# Patient Record
Sex: MALE | URBAN - METROPOLITAN AREA
[De-identification: names, ages, dates, MRNs, and addresses within clinical notes are randomized per-mention and may not be internally consistent; named-entity substitution may affect disease eponyms.]

---

## 2022-03-01 RX ADMIN — Medication 975 MILLIGRAM(S): at 22:24

## 2022-03-28 ENCOUNTER — INPATIENT (INPATIENT)
Facility: HOSPITAL | Age: 57
LOS: 6 days | Discharge: HOME HEALTH | DRG: 956 | End: 2022-04-04
Attending: SURGERY | Admitting: SURGERY
Payer: COMMERCIAL

## 2022-03-28 VITALS
RESPIRATION RATE: 30 BRPM | TEMPERATURE: 99 F | DIASTOLIC BLOOD PRESSURE: 80 MMHG | OXYGEN SATURATION: 100 % | SYSTOLIC BLOOD PRESSURE: 136 MMHG | HEART RATE: 89 BPM

## 2022-03-28 DIAGNOSIS — S73.005A UNSPECIFIED DISLOCATION OF LEFT HIP, INITIAL ENCOUNTER: ICD-10-CM

## 2022-03-28 LAB
ALBUMIN SERPL ELPH-MCNC: 4.4 G/DL — SIGNIFICANT CHANGE UP (ref 3.3–5)
ALP SERPL-CCNC: 79 U/L — SIGNIFICANT CHANGE UP (ref 40–120)
ALT FLD-CCNC: 38 U/L — SIGNIFICANT CHANGE UP (ref 10–45)
ANION GAP SERPL CALC-SCNC: 15 MMOL/L — SIGNIFICANT CHANGE UP (ref 5–17)
APPEARANCE UR: CLEAR — SIGNIFICANT CHANGE UP
APTT BLD: 23.9 SEC — LOW (ref 27.5–35.5)
AST SERPL-CCNC: 36 U/L — SIGNIFICANT CHANGE UP (ref 10–40)
BACTERIA # UR AUTO: NEGATIVE — SIGNIFICANT CHANGE UP
BASE EXCESS BLDV CALC-SCNC: -1.2 MMOL/L — SIGNIFICANT CHANGE UP (ref -2–2)
BASOPHILS # BLD AUTO: 0.09 K/UL — SIGNIFICANT CHANGE UP (ref 0–0.2)
BASOPHILS NFR BLD AUTO: 0.7 % — SIGNIFICANT CHANGE UP (ref 0–2)
BILIRUB SERPL-MCNC: 0.3 MG/DL — SIGNIFICANT CHANGE UP (ref 0.2–1.2)
BILIRUB UR-MCNC: NEGATIVE — SIGNIFICANT CHANGE UP
BLD GP AB SCN SERPL QL: NEGATIVE — SIGNIFICANT CHANGE UP
BUN SERPL-MCNC: 21 MG/DL — SIGNIFICANT CHANGE UP (ref 7–23)
CA-I SERPL-SCNC: 1.16 MMOL/L — SIGNIFICANT CHANGE UP (ref 1.15–1.33)
CALCIUM SERPL-MCNC: 9 MG/DL — SIGNIFICANT CHANGE UP (ref 8.4–10.5)
CHLORIDE BLDV-SCNC: 100 MMOL/L — SIGNIFICANT CHANGE UP (ref 96–108)
CHLORIDE SERPL-SCNC: 101 MMOL/L — SIGNIFICANT CHANGE UP (ref 96–108)
CO2 BLDV-SCNC: 28 MMOL/L — HIGH (ref 22–26)
CO2 SERPL-SCNC: 24 MMOL/L — SIGNIFICANT CHANGE UP (ref 22–31)
COLOR SPEC: SIGNIFICANT CHANGE UP
CREAT SERPL-MCNC: 1.15 MG/DL — SIGNIFICANT CHANGE UP (ref 0.5–1.3)
DIFF PNL FLD: ABNORMAL
EGFR: 74 ML/MIN/1.73M2 — SIGNIFICANT CHANGE UP
EOSINOPHIL # BLD AUTO: 0.22 K/UL — SIGNIFICANT CHANGE UP (ref 0–0.5)
EOSINOPHIL NFR BLD AUTO: 1.7 % — SIGNIFICANT CHANGE UP (ref 0–6)
EPI CELLS # UR: 1 /HPF — SIGNIFICANT CHANGE UP
ETHANOL SERPL-MCNC: SIGNIFICANT CHANGE UP MG/DL (ref 0–10)
GAS PNL BLDV: 138 MMOL/L — SIGNIFICANT CHANGE UP (ref 136–145)
GAS PNL BLDV: SIGNIFICANT CHANGE UP
GAS PNL BLDV: SIGNIFICANT CHANGE UP
GLUCOSE BLDV-MCNC: 163 MG/DL — HIGH (ref 70–99)
GLUCOSE SERPL-MCNC: 167 MG/DL — HIGH (ref 70–99)
GLUCOSE UR QL: NEGATIVE — SIGNIFICANT CHANGE UP
HCO3 BLDV-SCNC: 27 MMOL/L — SIGNIFICANT CHANGE UP (ref 22–29)
HCT VFR BLD CALC: 42.7 % — SIGNIFICANT CHANGE UP (ref 39–50)
HCT VFR BLDA CALC: 44 % — SIGNIFICANT CHANGE UP (ref 39–51)
HGB BLD CALC-MCNC: 14.8 G/DL — SIGNIFICANT CHANGE UP (ref 12.6–17.4)
HGB BLD-MCNC: 14.4 G/DL — SIGNIFICANT CHANGE UP (ref 13–17)
HYALINE CASTS # UR AUTO: 1 /LPF — SIGNIFICANT CHANGE UP (ref 0–2)
IMM GRANULOCYTES NFR BLD AUTO: 0.7 % — SIGNIFICANT CHANGE UP (ref 0–1.5)
INR BLD: 0.99 RATIO — SIGNIFICANT CHANGE UP (ref 0.88–1.16)
KETONES UR-MCNC: NEGATIVE — SIGNIFICANT CHANGE UP
LACTATE BLDV-MCNC: 3.1 MMOL/L — HIGH (ref 0.7–2)
LEUKOCYTE ESTERASE UR-ACNC: NEGATIVE — SIGNIFICANT CHANGE UP
LIDOCAIN IGE QN: 40 U/L — SIGNIFICANT CHANGE UP (ref 7–60)
LYMPHOCYTES # BLD AUTO: 24.5 % — SIGNIFICANT CHANGE UP (ref 13–44)
LYMPHOCYTES # BLD AUTO: 3.13 K/UL — SIGNIFICANT CHANGE UP (ref 1–3.3)
MCHC RBC-ENTMCNC: 30.6 PG — SIGNIFICANT CHANGE UP (ref 27–34)
MCHC RBC-ENTMCNC: 33.7 GM/DL — SIGNIFICANT CHANGE UP (ref 32–36)
MCV RBC AUTO: 90.9 FL — SIGNIFICANT CHANGE UP (ref 80–100)
MONOCYTES # BLD AUTO: 1.15 K/UL — HIGH (ref 0–0.9)
MONOCYTES NFR BLD AUTO: 9 % — SIGNIFICANT CHANGE UP (ref 2–14)
NEUTROPHILS # BLD AUTO: 8.07 K/UL — HIGH (ref 1.8–7.4)
NEUTROPHILS NFR BLD AUTO: 63.4 % — SIGNIFICANT CHANGE UP (ref 43–77)
NITRITE UR-MCNC: NEGATIVE — SIGNIFICANT CHANGE UP
NRBC # BLD: 0 /100 WBCS — SIGNIFICANT CHANGE UP (ref 0–0)
PCO2 BLDV: 57 MMHG — HIGH (ref 42–55)
PH BLDV: 7.28 — LOW (ref 7.32–7.43)
PH UR: 6 — SIGNIFICANT CHANGE UP (ref 5–8)
PLATELET # BLD AUTO: 262 K/UL — SIGNIFICANT CHANGE UP (ref 150–400)
PO2 BLDV: 29 MMHG — SIGNIFICANT CHANGE UP (ref 25–45)
POTASSIUM BLDV-SCNC: 3.7 MMOL/L — SIGNIFICANT CHANGE UP (ref 3.5–5.1)
POTASSIUM SERPL-MCNC: 3.7 MMOL/L — SIGNIFICANT CHANGE UP (ref 3.5–5.3)
POTASSIUM SERPL-SCNC: 3.7 MMOL/L — SIGNIFICANT CHANGE UP (ref 3.5–5.3)
PROT SERPL-MCNC: 6.7 G/DL — SIGNIFICANT CHANGE UP (ref 6–8.3)
PROT UR-MCNC: ABNORMAL
PROTHROM AB SERPL-ACNC: 11.4 SEC — SIGNIFICANT CHANGE UP (ref 10.5–13.4)
RBC # BLD: 4.7 M/UL — SIGNIFICANT CHANGE UP (ref 4.2–5.8)
RBC # FLD: 12.6 % — SIGNIFICANT CHANGE UP (ref 10.3–14.5)
RBC CASTS # UR COMP ASSIST: 2 /HPF — SIGNIFICANT CHANGE UP (ref 0–4)
RH IG SCN BLD-IMP: NEGATIVE — SIGNIFICANT CHANGE UP
SAO2 % BLDV: 39.6 % — LOW (ref 67–88)
SARS-COV-2 RNA SPEC QL NAA+PROBE: SIGNIFICANT CHANGE UP
SODIUM SERPL-SCNC: 140 MMOL/L — SIGNIFICANT CHANGE UP (ref 135–145)
SP GR SPEC: 1.05 — HIGH (ref 1.01–1.02)
UROBILINOGEN FLD QL: NEGATIVE — SIGNIFICANT CHANGE UP
WBC # BLD: 12.75 K/UL — HIGH (ref 3.8–10.5)
WBC # FLD AUTO: 12.75 K/UL — HIGH (ref 3.8–10.5)
WBC UR QL: 2 /HPF — SIGNIFICANT CHANGE UP (ref 0–5)

## 2022-03-28 PROCEDURE — 99291 CRITICAL CARE FIRST HOUR: CPT | Mod: 25

## 2022-03-28 PROCEDURE — 73562 X-RAY EXAM OF KNEE 3: CPT | Mod: 26,LT

## 2022-03-28 PROCEDURE — 71045 X-RAY EXAM CHEST 1 VIEW: CPT | Mod: 26

## 2022-03-28 PROCEDURE — 72125 CT NECK SPINE W/O DYE: CPT | Mod: 26,MA

## 2022-03-28 PROCEDURE — 73552 X-RAY EXAM OF FEMUR 2/>: CPT | Mod: 26,LT

## 2022-03-28 PROCEDURE — 93010 ELECTROCARDIOGRAM REPORT: CPT

## 2022-03-28 PROCEDURE — 73502 X-RAY EXAM HIP UNI 2-3 VIEWS: CPT | Mod: 26,LT

## 2022-03-28 PROCEDURE — 70486 CT MAXILLOFACIAL W/O DYE: CPT | Mod: 26,MA

## 2022-03-28 PROCEDURE — 72170 X-RAY EXAM OF PELVIS: CPT | Mod: 26

## 2022-03-28 PROCEDURE — 99223 1ST HOSP IP/OBS HIGH 75: CPT

## 2022-03-28 PROCEDURE — 70450 CT HEAD/BRAIN W/O DYE: CPT | Mod: 26,59,MA

## 2022-03-28 PROCEDURE — 71260 CT THORAX DX C+: CPT | Mod: 26,MA

## 2022-03-28 PROCEDURE — 70498 CT ANGIOGRAPHY NECK: CPT | Mod: 26,MA

## 2022-03-28 PROCEDURE — 70496 CT ANGIOGRAPHY HEAD: CPT | Mod: 26,MA

## 2022-03-28 PROCEDURE — 74177 CT ABD & PELVIS W/CONTRAST: CPT | Mod: 26,MA

## 2022-03-28 PROCEDURE — 73590 X-RAY EXAM OF LOWER LEG: CPT | Mod: 26,LT

## 2022-03-28 RX ORDER — SODIUM CHLORIDE 9 MG/ML
250 INJECTION INTRAMUSCULAR; INTRAVENOUS; SUBCUTANEOUS ONCE
Refills: 0 | Status: COMPLETED | OUTPATIENT
Start: 2022-03-28 | End: 2022-03-28

## 2022-03-28 RX ORDER — IBUPROFEN 200 MG
400 TABLET ORAL EVERY 6 HOURS
Refills: 0 | Status: DISCONTINUED | OUTPATIENT
Start: 2022-03-29 | End: 2022-03-29

## 2022-03-28 RX ORDER — LIDOCAINE 4 G/100G
1 CREAM TOPICAL EVERY 24 HOURS
Refills: 0 | Status: DISCONTINUED | OUTPATIENT
Start: 2022-03-28 | End: 2022-03-29

## 2022-03-28 RX ORDER — ENOXAPARIN SODIUM 100 MG/ML
40 INJECTION SUBCUTANEOUS EVERY 24 HOURS
Refills: 0 | Status: DISCONTINUED | OUTPATIENT
Start: 2022-03-28 | End: 2022-03-29

## 2022-03-28 RX ORDER — SODIUM CHLORIDE 9 MG/ML
1000 INJECTION, SOLUTION INTRAVENOUS
Refills: 0 | Status: DISCONTINUED | OUTPATIENT
Start: 2022-03-28 | End: 2022-03-29

## 2022-03-28 RX ORDER — OXYCODONE HYDROCHLORIDE 5 MG/1
5 TABLET ORAL EVERY 4 HOURS
Refills: 0 | Status: DISCONTINUED | OUTPATIENT
Start: 2022-03-28 | End: 2022-03-29

## 2022-03-28 RX ORDER — ACETAMINOPHEN 500 MG
1000 TABLET ORAL ONCE
Refills: 0 | Status: COMPLETED | OUTPATIENT
Start: 2022-03-28 | End: 2022-03-28

## 2022-03-28 RX ORDER — ACETAMINOPHEN 500 MG
975 TABLET ORAL EVERY 6 HOURS
Refills: 0 | Status: DISCONTINUED | OUTPATIENT
Start: 2022-03-29 | End: 2022-03-29

## 2022-03-28 RX ORDER — SENNA PLUS 8.6 MG/1
2 TABLET ORAL AT BEDTIME
Refills: 0 | Status: DISCONTINUED | OUTPATIENT
Start: 2022-03-28 | End: 2022-03-29

## 2022-03-28 RX ORDER — HYDROMORPHONE HYDROCHLORIDE 2 MG/ML
0.5 INJECTION INTRAMUSCULAR; INTRAVENOUS; SUBCUTANEOUS ONCE
Refills: 0 | Status: DISCONTINUED | OUTPATIENT
Start: 2022-03-28 | End: 2022-03-28

## 2022-03-28 RX ORDER — FENTANYL CITRATE 50 UG/ML
50 INJECTION INTRAVENOUS ONCE
Refills: 0 | Status: DISCONTINUED | OUTPATIENT
Start: 2022-03-28 | End: 2022-03-28

## 2022-03-28 RX ORDER — POLYETHYLENE GLYCOL 3350 17 G/17G
17 POWDER, FOR SOLUTION ORAL DAILY
Refills: 0 | Status: DISCONTINUED | OUTPATIENT
Start: 2022-03-28 | End: 2022-03-29

## 2022-03-28 RX ORDER — CHLORHEXIDINE GLUCONATE 213 G/1000ML
1 SOLUTION TOPICAL
Refills: 0 | Status: DISCONTINUED | OUTPATIENT
Start: 2022-03-29 | End: 2022-03-29

## 2022-03-28 RX ORDER — OXYCODONE HYDROCHLORIDE 5 MG/1
10 TABLET ORAL EVERY 6 HOURS
Refills: 0 | Status: DISCONTINUED | OUTPATIENT
Start: 2022-03-28 | End: 2022-03-29

## 2022-03-28 RX ORDER — TETANUS TOXOID, REDUCED DIPHTHERIA TOXOID AND ACELLULAR PERTUSSIS VACCINE, ADSORBED 5; 2.5; 8; 8; 2.5 [IU]/.5ML; [IU]/.5ML; UG/.5ML; UG/.5ML; UG/.5ML
0.5 SUSPENSION INTRAMUSCULAR ONCE
Refills: 0 | Status: COMPLETED | OUTPATIENT
Start: 2022-03-28 | End: 2022-03-28

## 2022-03-28 RX ADMIN — HYDROMORPHONE HYDROCHLORIDE 0.5 MILLIGRAM(S): 2 INJECTION INTRAMUSCULAR; INTRAVENOUS; SUBCUTANEOUS at 22:24

## 2022-03-28 RX ADMIN — SODIUM CHLORIDE 250 MILLILITER(S): 9 INJECTION INTRAMUSCULAR; INTRAVENOUS; SUBCUTANEOUS at 20:28

## 2022-03-28 RX ADMIN — FENTANYL CITRATE 50 MICROGRAM(S): 50 INJECTION INTRAVENOUS at 21:08

## 2022-03-28 RX ADMIN — Medication 400 MILLIGRAM(S): at 21:10

## 2022-03-28 RX ADMIN — FENTANYL CITRATE 50 MICROGRAM(S): 50 INJECTION INTRAVENOUS at 20:05

## 2022-03-28 RX ADMIN — TETANUS TOXOID, REDUCED DIPHTHERIA TOXOID AND ACELLULAR PERTUSSIS VACCINE, ADSORBED 0.5 MILLILITER(S): 5; 2.5; 8; 8; 2.5 SUSPENSION INTRAMUSCULAR at 20:36
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
## 2022-03-28 NOTE — PROGRESS NOTE ADULT - SUBJECTIVE AND OBJECTIVE BOX
Pt seen  Chart reviewed  Full consult/procedure note dictation to follow    Open nasal fx  Lac repaired  Fracture reduced and splinted  Pt and wife instructed in care  f/u in office post d/c
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
## 2022-03-28 NOTE — ED PROVIDER NOTE - PHYSICAL EXAMINATION
General: in mild distress due to pain, well developed, ABC intact  HENT: laceration of the nose, PERRLA, EOMI, TTP of the maxilla and dry blood periorally, moist mucosa.  Neck: in c-collar   Cardiovascular: RRR, S1&2, no M or R, radial pulses equal and b/l, mild ecchymosis in the left lateral chest with TTP.   Respiratory: CTABL, no wheezes or crackles, no decreased breath sounds  Abdominal: soft and non-tender non distended, neg for guarding, no CVA tenderness   Extremities: right leg is inwardly and internally rotated, distal pulses intact in DP/PT, no edema of the legs/feet  Skin: warm, well perfused  Neurologic: moving distal extremities  AAOx3, following commands   Psych: normal mood and affect

## 2022-03-28 NOTE — ED PROVIDER NOTE - WET READ LAUNCH FT
There are 2 Wet Read(s) to document. There are no Wet Read(s) to document. There are 3 Wet Read(s) to document.

## 2022-03-28 NOTE — PATIENT PROFILE ADULT - FALL HARM RISK - HARM RISK INTERVENTIONS
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
## 2022-03-28 NOTE — ED PROVIDER NOTE - SECONDARY DIAGNOSIS.
Facial fracture Multiple fractures of ribs of left side Pneumothorax Closed fracture of lumbar vertebra without spinal cord injury
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
## 2022-03-28 NOTE — ED ADULT NURSE REASSESSMENT NOTE - NS ED NURSE REASSESS COMMENT FT1
Plastic surgery MD at bedside for consult & evaluation. Plastic surgery MD at bedside for consult & repair of B/L nasal fractures.
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
## 2022-03-28 NOTE — ED PROVIDER NOTE - NS ED ROS FT
GENERAL: No fever or chills, weight changes, nightsweats  EYES: no change in vision  HEENT: no dysplasia, odynophagia, ear pain, rhinorrhea, epistasis   CARDIAC: no palpitation   PULMONARY: no productive cough or SOB  GI: no abdominal pain, no nausea or no vomiting, no diarrhea or constipation  : No changes in urination for pain/freq.   SKIN: no rashes, abnormal bruising or bleeding  NEURO: no headache, numbness/tingling, extremity weakness   MSK: HPI
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
## 2022-03-28 NOTE — H&P ADULT - NSHPLABSRESULTS_GEN_ALL_CORE
CT cspine: IMPRESSION:   No vertebral fracture is recognized.  CTA head/neck: IMPRESSION:      1.   Right carotid system:  No hemodynamically significant stenosis.      2.   Left carotid system:  No hemodynamically significant stenosis.      3.   Intracranial circulation:  No hemodynamically significant   stenosis.      4.   Brain: No acute intracranial abnormality. Numerous displaced and   angulated bilateral nasal fractures. Please see report of CT facial bones   for facial evaluation. There is a large hematoma of the left cheek.  CT max face: IMPRESSION:  There are displaced and angulated bilateral nasal fractures, many of   which are angulated rightward. There is marked overlying soft tissue   swelling. There is a large hematoma of the left cheek. No definite   additional facial fractures are identified.  There are very small layering hemorrhagic fluid in the maxillary sinuses   and sphenoid sinuses.    CTA c/a/p:  IMPRESSION:  Fractures of the left sixth through ninth ribs and right second through   fourth ribs. The left eighth rib is fractured in 2 places.  Tiny left pneumothorax.  Probable pulmonary contusion in the anterior right upper lobe.  Age indeterminate fracture of the right L2 transverse process.  Posterior fracture dislocation of the left femoral head, which appears   impacted. Comminuted fracture of the left posterior acetabulum.

## 2022-03-29 DIAGNOSIS — S22.49XA MULTIPLE FRACTURES OF RIBS, UNSPECIFIED SIDE, INITIAL ENCOUNTER FOR CLOSED FRACTURE: ICD-10-CM

## 2022-03-29 DIAGNOSIS — E27.8 OTHER SPECIFIED DISORDERS OF ADRENAL GLAND: ICD-10-CM

## 2022-03-29 DIAGNOSIS — S02.2XXA FRACTURE OF NASAL BONES, INITIAL ENCOUNTER FOR CLOSED FRACTURE: ICD-10-CM

## 2022-03-29 DIAGNOSIS — Z01.818 ENCOUNTER FOR OTHER PREPROCEDURAL EXAMINATION: ICD-10-CM

## 2022-03-29 DIAGNOSIS — S72.002A FRACTURE OF UNSPECIFIED PART OF NECK OF LEFT FEMUR, INITIAL ENCOUNTER FOR CLOSED FRACTURE: ICD-10-CM

## 2022-03-29 DIAGNOSIS — R73.9 HYPERGLYCEMIA, UNSPECIFIED: ICD-10-CM

## 2022-03-29 LAB
A1C WITH ESTIMATED AVERAGE GLUCOSE RESULT: 5.4 % — SIGNIFICANT CHANGE UP (ref 4–5.6)
ANION GAP SERPL CALC-SCNC: 13 MMOL/L — SIGNIFICANT CHANGE UP (ref 5–17)
APTT BLD: 24.3 SEC — LOW (ref 27.5–35.5)
BUN SERPL-MCNC: 18 MG/DL — SIGNIFICANT CHANGE UP (ref 7–23)
CALCIUM SERPL-MCNC: 8.5 MG/DL — SIGNIFICANT CHANGE UP (ref 8.4–10.5)
CHLORIDE SERPL-SCNC: 104 MMOL/L — SIGNIFICANT CHANGE UP (ref 96–108)
CO2 SERPL-SCNC: 21 MMOL/L — LOW (ref 22–31)
CREAT SERPL-MCNC: 0.94 MG/DL — SIGNIFICANT CHANGE UP (ref 0.5–1.3)
EGFR: 95 ML/MIN/1.73M2 — SIGNIFICANT CHANGE UP
ESTIMATED AVERAGE GLUCOSE: 108 MG/DL — SIGNIFICANT CHANGE UP (ref 68–114)
GAS PNL BLDA: SIGNIFICANT CHANGE UP
GAS PNL BLDV: SIGNIFICANT CHANGE UP
GLUCOSE BLDC GLUCOMTR-MCNC: 116 MG/DL — HIGH (ref 70–99)
GLUCOSE BLDC GLUCOMTR-MCNC: 129 MG/DL — HIGH (ref 70–99)
GLUCOSE BLDC GLUCOMTR-MCNC: 226 MG/DL — HIGH (ref 70–99)
GLUCOSE SERPL-MCNC: 173 MG/DL — HIGH (ref 70–99)
HCT VFR BLD CALC: 40.3 % — SIGNIFICANT CHANGE UP (ref 39–50)
HCV AB S/CO SERPL IA: 0.07 S/CO — SIGNIFICANT CHANGE UP (ref 0–0.99)
HCV AB SERPL-IMP: SIGNIFICANT CHANGE UP
HGB BLD-MCNC: 13.6 G/DL — SIGNIFICANT CHANGE UP (ref 13–17)
INR BLD: 1.07 RATIO — SIGNIFICANT CHANGE UP (ref 0.88–1.16)
MAGNESIUM SERPL-MCNC: 1.9 MG/DL — SIGNIFICANT CHANGE UP (ref 1.6–2.6)
MCHC RBC-ENTMCNC: 30.2 PG — SIGNIFICANT CHANGE UP (ref 27–34)
MCHC RBC-ENTMCNC: 33.7 GM/DL — SIGNIFICANT CHANGE UP (ref 32–36)
MCV RBC AUTO: 89.4 FL — SIGNIFICANT CHANGE UP (ref 80–100)
NRBC # BLD: 0 /100 WBCS — SIGNIFICANT CHANGE UP (ref 0–0)
PHOSPHATE SERPL-MCNC: 2.3 MG/DL — LOW (ref 2.5–4.5)
PLATELET # BLD AUTO: 207 K/UL — SIGNIFICANT CHANGE UP (ref 150–400)
POTASSIUM SERPL-MCNC: 4.2 MMOL/L — SIGNIFICANT CHANGE UP (ref 3.5–5.3)
POTASSIUM SERPL-SCNC: 4.2 MMOL/L — SIGNIFICANT CHANGE UP (ref 3.5–5.3)
PROTHROM AB SERPL-ACNC: 12.3 SEC — SIGNIFICANT CHANGE UP (ref 10.5–13.4)
RBC # BLD: 4.51 M/UL — SIGNIFICANT CHANGE UP (ref 4.2–5.8)
RBC # FLD: 12.8 % — SIGNIFICANT CHANGE UP (ref 10.3–14.5)
SARS-COV-2 RNA SPEC QL NAA+PROBE: SIGNIFICANT CHANGE UP
SODIUM SERPL-SCNC: 138 MMOL/L — SIGNIFICANT CHANGE UP (ref 135–145)
WBC # BLD: 12.85 K/UL — HIGH (ref 3.8–10.5)
WBC # FLD AUTO: 12.85 K/UL — HIGH (ref 3.8–10.5)

## 2022-03-29 PROCEDURE — 99223 1ST HOSP IP/OBS HIGH 75: CPT

## 2022-03-29 PROCEDURE — 99223 1ST HOSP IP/OBS HIGH 75: CPT | Mod: GC

## 2022-03-29 PROCEDURE — 71045 X-RAY EXAM CHEST 1 VIEW: CPT | Mod: 26

## 2022-03-29 PROCEDURE — 93010 ELECTROCARDIOGRAM REPORT: CPT

## 2022-03-29 DEVICE — SCREW CORT S-T 3.5X36MM: Type: IMPLANTABLE DEVICE | Site: LEFT | Status: FUNCTIONAL

## 2022-03-29 DEVICE — IMPLANTABLE DEVICE: Type: IMPLANTABLE DEVICE | Site: LEFT | Status: FUNCTIONAL

## 2022-03-29 DEVICE — KIT A-LINE 1LUM 20G X 12CM SAFE KIT: Type: IMPLANTABLE DEVICE | Site: LEFT | Status: FUNCTIONAL

## 2022-03-29 DEVICE — SCREW CORT S-T 3.5X18MM: Type: IMPLANTABLE DEVICE | Site: LEFT | Status: FUNCTIONAL

## 2022-03-29 DEVICE — SCREW CORT S-T 3.5X40MM: Type: IMPLANTABLE DEVICE | Site: LEFT | Status: FUNCTIONAL

## 2022-03-29 DEVICE — SCREW CORT S-T 3.5X28MM: Type: IMPLANTABLE DEVICE | Site: LEFT | Status: FUNCTIONAL

## 2022-03-29 DEVICE — SCREW CORTEX S-T 2.7X28MM: Type: IMPLANTABLE DEVICE | Site: LEFT | Status: FUNCTIONAL

## 2022-03-29 DEVICE — K-WIRE DEPUY (SMOOTH) TROCAR POINT 0.62 X 9": Type: IMPLANTABLE DEVICE | Site: LEFT | Status: FUNCTIONAL

## 2022-03-29 DEVICE — SCREW CORT 3.5MM X 44MM: Type: IMPLANTABLE DEVICE | Site: LEFT | Status: FUNCTIONAL

## 2022-03-29 DEVICE — SCREW CORT S-T 3.5X38MM: Type: IMPLANTABLE DEVICE | Site: LEFT | Status: FUNCTIONAL

## 2022-03-29 RX ORDER — ONDANSETRON 8 MG/1
4 TABLET, FILM COATED ORAL EVERY 6 HOURS
Refills: 0 | Status: DISCONTINUED | OUTPATIENT
Start: 2022-03-29 | End: 2022-03-29

## 2022-03-29 RX ORDER — INSULIN LISPRO 100/ML
VIAL (ML) SUBCUTANEOUS EVERY 6 HOURS
Refills: 0 | Status: DISCONTINUED | OUTPATIENT
Start: 2022-03-29 | End: 2022-03-29

## 2022-03-29 RX ORDER — HYDROMORPHONE HYDROCHLORIDE 2 MG/ML
30 INJECTION INTRAMUSCULAR; INTRAVENOUS; SUBCUTANEOUS
Refills: 0 | Status: DISCONTINUED | OUTPATIENT
Start: 2022-03-29 | End: 2022-03-29

## 2022-03-29 RX ORDER — DIPHENHYDRAMINE HCL 50 MG
25 CAPSULE ORAL EVERY 4 HOURS
Refills: 0 | Status: DISCONTINUED | OUTPATIENT
Start: 2022-03-29 | End: 2022-03-29

## 2022-03-29 RX ORDER — HYDROMORPHONE HYDROCHLORIDE 2 MG/ML
0.5 INJECTION INTRAMUSCULAR; INTRAVENOUS; SUBCUTANEOUS
Refills: 0 | Status: DISCONTINUED | OUTPATIENT
Start: 2022-03-29 | End: 2022-03-29

## 2022-03-29 RX ORDER — MAGNESIUM SULFATE 500 MG/ML
2 VIAL (ML) INJECTION ONCE
Refills: 0 | Status: COMPLETED | OUTPATIENT
Start: 2022-03-29 | End: 2022-03-29

## 2022-03-29 RX ORDER — NALOXONE HYDROCHLORIDE 4 MG/.1ML
0.1 SPRAY NASAL
Refills: 0 | Status: DISCONTINUED | OUTPATIENT
Start: 2022-03-29 | End: 2022-03-29

## 2022-03-29 RX ORDER — ENOXAPARIN SODIUM 100 MG/ML
40 INJECTION SUBCUTANEOUS EVERY 24 HOURS
Refills: 0 | Status: DISCONTINUED | OUTPATIENT
Start: 2022-03-29 | End: 2022-03-29

## 2022-03-29 RX ADMIN — POLYETHYLENE GLYCOL 3350 17 GRAM(S): 17 POWDER, FOR SOLUTION ORAL at 12:11

## 2022-03-29 RX ADMIN — OXYCODONE HYDROCHLORIDE 10 MILLIGRAM(S): 5 TABLET ORAL at 01:46

## 2022-03-29 RX ADMIN — HYDROMORPHONE HYDROCHLORIDE 0.5 MILLIGRAM(S): 2 INJECTION INTRAMUSCULAR; INTRAVENOUS; SUBCUTANEOUS at 03:20

## 2022-03-29 RX ADMIN — Medication 250 MILLIMOLE(S): at 04:31

## 2022-03-29 RX ADMIN — Medication 400 MILLIGRAM(S): at 15:22

## 2022-03-29 RX ADMIN — OXYCODONE HYDROCHLORIDE 10 MILLIGRAM(S): 5 TABLET ORAL at 02:16

## 2022-03-29 RX ADMIN — Medication 400 MILLIGRAM(S): at 12:50

## 2022-03-29 RX ADMIN — Medication 400 MILLIGRAM(S): at 10:45

## 2022-03-29 RX ADMIN — HYDROMORPHONE HYDROCHLORIDE 0.5 MILLIGRAM(S): 2 INJECTION INTRAMUSCULAR; INTRAVENOUS; SUBCUTANEOUS at 06:49

## 2022-03-29 RX ADMIN — Medication 400 MILLIGRAM(S): at 10:13

## 2022-03-29 RX ADMIN — Medication 2: at 05:14

## 2022-03-29 RX ADMIN — CHLORHEXIDINE GLUCONATE 1 APPLICATION(S): 213 SOLUTION TOPICAL at 05:15

## 2022-03-29 RX ADMIN — Medication 400 MILLIGRAM(S): at 03:04

## 2022-03-29 RX ADMIN — LIDOCAINE 1 PATCH: 4 CREAM TOPICAL at 05:14

## 2022-03-29 RX ADMIN — Medication 975 MILLIGRAM(S): at 12:26

## 2022-03-29 RX ADMIN — HYDROMORPHONE HYDROCHLORIDE 0.5 MILLIGRAM(S): 2 INJECTION INTRAMUSCULAR; INTRAVENOUS; SUBCUTANEOUS at 03:07

## 2022-03-29 RX ADMIN — Medication 975 MILLIGRAM(S): at 05:13

## 2022-03-29 RX ADMIN — Medication 25 GRAM(S): at 03:04

## 2022-03-29 RX ADMIN — Medication 975 MILLIGRAM(S): at 12:11

## 2022-03-29 RX ADMIN — LIDOCAINE 1 PATCH: 4 CREAM TOPICAL at 05:13

## 2022-03-29 NOTE — CONSULT NOTE ADULT - PROBLEM SELECTOR RECOMMENDATION 2
- Plastic surgery evaluation noted  - Laceration repaired  - Nasal fracture reduced and splinted  - Plan to follow-up as outpatient
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
## 2022-03-29 NOTE — PROGRESS NOTE ADULT - SUBJECTIVE AND OBJECTIVE BOX
ACS DAILY PROGRESS NOTE:     24 HOUR EVENTS: Cervical collar cleared    Patient seen and examined at bedside. Patient complains of pain around his left hip. Denies chest pain, sob, n/v, fever/chills.     MEDICATIONS  (STANDING):  acetaminophen     Tablet .. 975 milliGRAM(s) Oral every 6 hours  chlorhexidine 2% Cloths 1 Application(s) Topical <User Schedule>  ibuprofen  Tablet. 400 milliGRAM(s) Oral every 6 hours  insulin lispro (ADMELOG) corrective regimen sliding scale   SubCutaneous every 6 hours  lactated ringers. 1000 milliLiter(s) (125 mL/Hr) IV Continuous <Continuous>  lidocaine   4% Patch 1 Patch Transdermal every 24 hours  lidocaine   4% Patch 1 Patch Transdermal every 24 hours  polyethylene glycol 3350 17 Gram(s) Oral daily  senna 2 Tablet(s) Oral at bedtime    MEDICATIONS  (PRN):  HYDROmorphone  Injectable 0.5 milliGRAM(s) IV Push every 3 hours PRN Severe Breakthrough Pain  oxyCODONE    IR 10 milliGRAM(s) Oral every 6 hours PRN Severe Pain (7 - 10)  oxyCODONE    IR 5 milliGRAM(s) Oral every 4 hours PRN Moderate Pain (4 - 6)      OBJECTIVE:    Vital Signs Last 24 Hrs  T(C): 37 (29 Mar 2022 03:00), Max: 37 (28 Mar 2022 19:38)  T(F): 98.6 (29 Mar 2022 03:00), Max: 98.6 (28 Mar 2022 19:38)  HR: 91 (29 Mar 2022 06:00) (84 - 103)  BP: 127/60 (29 Mar 2022 06:00) (127/60 - 154/82)  BP(mean): 87 (29 Mar 2022 06:00) (87 - 107)  RR: 13 (29 Mar 2022 06:00) (12 - 30)  SpO2: 93% (29 Mar 2022 06:00) (93% - 100%)        I&O's Detail    28 Mar 2022 07:01  -  29 Mar 2022 07:00  --------------------------------------------------------  IN:    IV PiggyBack: 550 mL    Lactated Ringers: 750 mL  Total IN: 1300 mL    OUT:    Voided (mL): 425 mL  Total OUT: 425 mL    Total NET: 875 mL          Daily Height in cm: 172.72 (28 Mar 2022 23:15)    Daily Weight in k (29 Mar 2022 00:00)    LABS:                        13.6   12.85 )-----------( 207      ( 29 Mar 2022 00:07 )             40.3     0329    138  |  104  |  18  ----------------------------<  173<H>  4.2   |  21<L>  |  0.94    Ca    8.5      29 Mar 2022 00:07  Phos  2.3       Mg     1.9         TPro  6.7  /  Alb  4.4  /  TBili  0.3  /  DBili  x   /  AST  36  /  ALT  38  /  AlkPhos  79  03-28    PT/INR - ( 29 Mar 2022 04:18 )   PT: 12.3 sec;   INR: 1.07 ratio         PTT - ( 29 Mar 2022 04:18 )  PTT:24.3 sec  Urinalysis Basic - ( 28 Mar 2022 21:48 )    Color: Light Yellow / Appearance: Clear / S.046 / pH: x  Gluc: x / Ketone: Negative  / Bili: Negative / Urobili: Negative   Blood: x / Protein: 30 mg/dL / Nitrite: Negative   Leuk Esterase: Negative / RBC: 2 /hpf / WBC 2 /HPF   Sq Epi: x / Non Sq Epi: 1 /hpf / Bacteria: Negative        Physical Exam  Gen: NAD, A&Ox3  Pulm: No respiratory distress,  bilateral chest wall tenderness, left chest wall ecchymosis  CV: RRR, no JVD  Abd:  soft, nontender, nondistended, LUQ ecchymosis  Extremities:  FROM, warm and well perfused, hip ROM limited 2/2 pain. +dp

## 2022-03-29 NOTE — SBIRT NOTE ADULT - NSSBIRTALCPOSREINDET_GEN_A_CORE
Patient reports 1-2 drinks 2-3x a week. Patient denies his use to be a problem at this time and declined resources.
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
## 2022-03-29 NOTE — PHYSICAL THERAPY INITIAL EVALUATION ADULT - GENERAL OBSERVATIONS, REHAB EVAL
pt recd supine in bed (+)tele (+)o2 via nc 2L (+)pulse ox (+)hip abductor pillow; family at bedside pt recd supine in bed (+)tele (+)o2 via nc 2L (+)pulse ox (+)hip abductor pillow (+)chest tube; family at bedside
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
## 2022-03-29 NOTE — PHYSICAL THERAPY INITIAL EVALUATION ADULT - DISCHARGE DISPOSITION, PT EVAL
progress towards home, family available to assist progress towards home, family available to assist, home PT for balance, gait and strengthening, DME rolling walker
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
## 2022-03-29 NOTE — PHYSICAL THERAPY INITIAL EVALUATION ADULT - ACTIVE RANGE OF MOTION EXAMINATION, REHAB EVAL
left hip THP maintained/bilateral upper extremity Active ROM was WFL (within functional limits)/bilateral  lower extremity Active ROM was WFL (within functional limits)
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
## 2022-03-29 NOTE — PHYSICAL THERAPY INITIAL EVALUATION ADULT - CRITERIA FOR SKILLED THERAPEUTIC INTERVENTIONS
impairments found/functional limitations in following categories/rehab potential/therapy frequency/anticipated equipment needs at discharge/anticipated discharge recommendation
Normal rate, regular rhythm.  Heart sounds S1, S2.  No murmurs, rubs or gallops.

## 2022-03-29 NOTE — PHYSICAL THERAPY INITIAL EVALUATION ADULT - ADDITIONAL COMMENTS
pt lives in apartment with wife, no stairs to negotiate. pt independent with mobility and did not use assisitve device Rituxan Counseling:  I discussed with the patient the risks of Rituxan infusions. Side effects can include infusion reactions, severe drug rashes including mucocutaneous reactions, reactivation of latent hepatitis and other infections and rarely progressive multifocal leukoencephalopathy.  All of the patient's questions and concerns were addressed.
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
## 2022-03-29 NOTE — CONSULT NOTE ADULT - PROBLEM SELECTOR RECOMMENDATION 5
Incidental, small size. Normotensive with normal electrolytes otherwise.  - Finding communicated to patient and wife at bedside.  - Should undergo functional testing post hospitalization, and repeat imaging to ensure size stability.  - No inpatient work-up planned at this time.
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
## 2022-03-29 NOTE — CONSULT NOTE ADULT - PROBLEM SELECTOR RECOMMENDATION 6
EKG reviewed: NSR. No history of personal cardiac condition. RCRI score is 0. Excellent exercise tolerance.  - No further cardiac testing indicated for this emergent procedure.  - Patient is medically optimized for OR today.

## 2022-03-29 NOTE — CONSULT NOTE ADULT - PROBLEM SELECTOR RECOMMENDATION 9
- Plan for OR today with orthopedic surgery  - NPO in anticipation of procedure  - VTE PPx per orthopedic surgery  - Pain control as ordered with bowel regimen

## 2022-03-30 LAB
ANION GAP SERPL CALC-SCNC: 11 MMOL/L — SIGNIFICANT CHANGE UP (ref 5–17)
APTT BLD: 25.2 SEC — LOW (ref 27.5–35.5)
BUN SERPL-MCNC: 13 MG/DL — SIGNIFICANT CHANGE UP (ref 7–23)
CALCIUM SERPL-MCNC: 8.3 MG/DL — LOW (ref 8.4–10.5)
CHLORIDE SERPL-SCNC: 104 MMOL/L — SIGNIFICANT CHANGE UP (ref 96–108)
CO2 SERPL-SCNC: 24 MMOL/L — SIGNIFICANT CHANGE UP (ref 22–31)
CREAT SERPL-MCNC: 0.92 MG/DL — SIGNIFICANT CHANGE UP (ref 0.5–1.3)
EGFR: 97 ML/MIN/1.73M2 — SIGNIFICANT CHANGE UP
GAS PNL BLDA: SIGNIFICANT CHANGE UP
GLUCOSE BLDC GLUCOMTR-MCNC: 135 MG/DL — HIGH (ref 70–99)
GLUCOSE BLDC GLUCOMTR-MCNC: 138 MG/DL — HIGH (ref 70–99)
GLUCOSE BLDC GLUCOMTR-MCNC: 192 MG/DL — HIGH (ref 70–99)
GLUCOSE SERPL-MCNC: 182 MG/DL — HIGH (ref 70–99)
HCT VFR BLD CALC: 27.6 % — LOW (ref 39–50)
HGB BLD-MCNC: 9.2 G/DL — LOW (ref 13–17)
INR BLD: 1.15 RATIO — SIGNIFICANT CHANGE UP (ref 0.88–1.16)
MAGNESIUM SERPL-MCNC: 2.2 MG/DL — SIGNIFICANT CHANGE UP (ref 1.6–2.6)
MCHC RBC-ENTMCNC: 31 PG — SIGNIFICANT CHANGE UP (ref 27–34)
MCHC RBC-ENTMCNC: 33.3 GM/DL — SIGNIFICANT CHANGE UP (ref 32–36)
MCV RBC AUTO: 92.9 FL — SIGNIFICANT CHANGE UP (ref 80–100)
NRBC # BLD: 0 /100 WBCS — SIGNIFICANT CHANGE UP (ref 0–0)
PHOSPHATE SERPL-MCNC: 4 MG/DL — SIGNIFICANT CHANGE UP (ref 2.5–4.5)
PLATELET # BLD AUTO: 142 K/UL — LOW (ref 150–400)
POTASSIUM SERPL-MCNC: 4.6 MMOL/L — SIGNIFICANT CHANGE UP (ref 3.5–5.3)
POTASSIUM SERPL-SCNC: 4.6 MMOL/L — SIGNIFICANT CHANGE UP (ref 3.5–5.3)
PROTHROM AB SERPL-ACNC: 13.4 SEC — SIGNIFICANT CHANGE UP (ref 10.5–13.4)
RBC # BLD: 2.97 M/UL — LOW (ref 4.2–5.8)
RBC # FLD: 12.9 % — SIGNIFICANT CHANGE UP (ref 10.3–14.5)
SODIUM SERPL-SCNC: 139 MMOL/L — SIGNIFICANT CHANGE UP (ref 135–145)
WBC # BLD: 7.87 K/UL — SIGNIFICANT CHANGE UP (ref 3.8–10.5)
WBC # FLD AUTO: 7.87 K/UL — SIGNIFICANT CHANGE UP (ref 3.8–10.5)

## 2022-03-30 PROCEDURE — 71045 X-RAY EXAM CHEST 1 VIEW: CPT | Mod: 26,77

## 2022-03-30 PROCEDURE — 88304 TISSUE EXAM BY PATHOLOGIST: CPT | Mod: 26

## 2022-03-30 PROCEDURE — 99233 SBSQ HOSP IP/OBS HIGH 50: CPT | Mod: GC

## 2022-03-30 PROCEDURE — 71045 X-RAY EXAM CHEST 1 VIEW: CPT | Mod: 26

## 2022-03-30 PROCEDURE — 32551 INSERTION OF CHEST TUBE: CPT

## 2022-03-30 PROCEDURE — 99233 SBSQ HOSP IP/OBS HIGH 50: CPT

## 2022-03-30 PROCEDURE — 88311 DECALCIFY TISSUE: CPT | Mod: 26

## 2022-03-30 PROCEDURE — 72190 X-RAY EXAM OF PELVIS: CPT | Mod: 26

## 2022-03-30 RX ORDER — LIDOCAINE 4 G/100G
1 CREAM TOPICAL EVERY 24 HOURS
Refills: 0 | Status: DISCONTINUED | OUTPATIENT
Start: 2022-03-30 | End: 2022-04-04

## 2022-03-30 RX ORDER — PROPOFOL 10 MG/ML
30 INJECTION, EMULSION INTRAVENOUS
Qty: 1000 | Refills: 0 | Status: DISCONTINUED | OUTPATIENT
Start: 2022-03-30 | End: 2022-03-30

## 2022-03-30 RX ORDER — POLYETHYLENE GLYCOL 3350 17 G/17G
17 POWDER, FOR SOLUTION ORAL DAILY
Refills: 0 | Status: DISCONTINUED | OUTPATIENT
Start: 2022-03-30 | End: 2022-04-01

## 2022-03-30 RX ORDER — SODIUM CHLORIDE 9 MG/ML
1000 INJECTION, SOLUTION INTRAVENOUS
Refills: 0 | Status: DISCONTINUED | OUTPATIENT
Start: 2022-03-30 | End: 2022-03-30

## 2022-03-30 RX ORDER — RIVAROXABAN 15 MG-20MG
10 KIT ORAL
Refills: 0 | Status: DISCONTINUED | OUTPATIENT
Start: 2022-03-30 | End: 2022-04-04

## 2022-03-30 RX ORDER — HYDROMORPHONE HYDROCHLORIDE 2 MG/ML
0.5 INJECTION INTRAMUSCULAR; INTRAVENOUS; SUBCUTANEOUS
Refills: 0 | Status: DISCONTINUED | OUTPATIENT
Start: 2022-03-30 | End: 2022-03-31

## 2022-03-30 RX ORDER — CHLORHEXIDINE GLUCONATE 213 G/1000ML
1 SOLUTION TOPICAL
Refills: 0 | Status: DISCONTINUED | OUTPATIENT
Start: 2022-03-30 | End: 2022-04-03

## 2022-03-30 RX ORDER — PANTOPRAZOLE SODIUM 20 MG/1
40 TABLET, DELAYED RELEASE ORAL DAILY
Refills: 0 | Status: DISCONTINUED | OUTPATIENT
Start: 2022-03-30 | End: 2022-03-30

## 2022-03-30 RX ORDER — SENNA PLUS 8.6 MG/1
2 TABLET ORAL AT BEDTIME
Refills: 0 | Status: DISCONTINUED | OUTPATIENT
Start: 2022-03-30 | End: 2022-04-04

## 2022-03-30 RX ORDER — CHLORHEXIDINE GLUCONATE 213 G/1000ML
15 SOLUTION TOPICAL EVERY 12 HOURS
Refills: 0 | Status: DISCONTINUED | OUTPATIENT
Start: 2022-03-30 | End: 2022-03-30

## 2022-03-30 RX ORDER — HYDROMORPHONE HYDROCHLORIDE 2 MG/ML
1 INJECTION INTRAMUSCULAR; INTRAVENOUS; SUBCUTANEOUS ONCE
Refills: 0 | Status: DISCONTINUED | OUTPATIENT
Start: 2022-03-30 | End: 2022-03-30

## 2022-03-30 RX ORDER — OXYCODONE HYDROCHLORIDE 5 MG/1
5 TABLET ORAL EVERY 4 HOURS
Refills: 0 | Status: DISCONTINUED | OUTPATIENT
Start: 2022-03-30 | End: 2022-04-04

## 2022-03-30 RX ORDER — INSULIN LISPRO 100/ML
VIAL (ML) SUBCUTANEOUS EVERY 6 HOURS
Refills: 0 | Status: DISCONTINUED | OUTPATIENT
Start: 2022-03-30 | End: 2022-03-30

## 2022-03-30 RX ORDER — ACETAMINOPHEN 500 MG
1000 TABLET ORAL EVERY 6 HOURS
Refills: 0 | Status: COMPLETED | OUTPATIENT
Start: 2022-03-30 | End: 2022-03-31

## 2022-03-30 RX ADMIN — OXYCODONE HYDROCHLORIDE 5 MILLIGRAM(S): 5 TABLET ORAL at 18:30

## 2022-03-30 RX ADMIN — HYDROMORPHONE HYDROCHLORIDE 0.5 MILLIGRAM(S): 2 INJECTION INTRAMUSCULAR; INTRAVENOUS; SUBCUTANEOUS at 17:16

## 2022-03-30 RX ADMIN — Medication 1000 MILLIGRAM(S): at 22:19

## 2022-03-30 RX ADMIN — LIDOCAINE 1 PATCH: 4 CREAM TOPICAL at 17:27

## 2022-03-30 RX ADMIN — HYDROMORPHONE HYDROCHLORIDE 0.5 MILLIGRAM(S): 2 INJECTION INTRAMUSCULAR; INTRAVENOUS; SUBCUTANEOUS at 12:25

## 2022-03-30 RX ADMIN — PROPOFOL 15.7 MICROGRAM(S)/KG/MIN: 10 INJECTION, EMULSION INTRAVENOUS at 05:40

## 2022-03-30 RX ADMIN — LIDOCAINE 1 PATCH: 4 CREAM TOPICAL at 07:22

## 2022-03-30 RX ADMIN — CHLORHEXIDINE GLUCONATE 1 APPLICATION(S): 213 SOLUTION TOPICAL at 05:40

## 2022-03-30 RX ADMIN — HYDROMORPHONE HYDROCHLORIDE 1 MILLIGRAM(S): 2 INJECTION INTRAMUSCULAR; INTRAVENOUS; SUBCUTANEOUS at 05:00

## 2022-03-30 RX ADMIN — Medication 1000 MILLIGRAM(S): at 15:23

## 2022-03-30 RX ADMIN — Medication 400 MILLIGRAM(S): at 15:08

## 2022-03-30 RX ADMIN — LIDOCAINE 1 PATCH: 4 CREAM TOPICAL at 05:40

## 2022-03-30 RX ADMIN — Medication 400 MILLIGRAM(S): at 09:33

## 2022-03-30 RX ADMIN — Medication 2: at 05:58

## 2022-03-30 RX ADMIN — Medication 400 MILLIGRAM(S): at 22:04

## 2022-03-30 RX ADMIN — HYDROMORPHONE HYDROCHLORIDE 0.5 MILLIGRAM(S): 2 INJECTION INTRAMUSCULAR; INTRAVENOUS; SUBCUTANEOUS at 07:05

## 2022-03-30 RX ADMIN — Medication 1000 MILLIGRAM(S): at 09:48

## 2022-03-30 RX ADMIN — OXYCODONE HYDROCHLORIDE 5 MILLIGRAM(S): 5 TABLET ORAL at 19:00

## 2022-03-30 RX ADMIN — SENNA PLUS 2 TABLET(S): 8.6 TABLET ORAL at 22:04

## 2022-03-30 RX ADMIN — PROPOFOL 15.7 MICROGRAM(S)/KG/MIN: 10 INJECTION, EMULSION INTRAVENOUS at 07:44

## 2022-03-30 RX ADMIN — HYDROMORPHONE HYDROCHLORIDE 0.5 MILLIGRAM(S): 2 INJECTION INTRAMUSCULAR; INTRAVENOUS; SUBCUTANEOUS at 17:31

## 2022-03-30 RX ADMIN — OXYCODONE HYDROCHLORIDE 5 MILLIGRAM(S): 5 TABLET ORAL at 22:40

## 2022-03-30 RX ADMIN — CHLORHEXIDINE GLUCONATE 15 MILLILITER(S): 213 SOLUTION TOPICAL at 05:39

## 2022-03-30 RX ADMIN — LIDOCAINE 1 PATCH: 4 CREAM TOPICAL at 07:00

## 2022-03-30 RX ADMIN — OXYCODONE HYDROCHLORIDE 5 MILLIGRAM(S): 5 TABLET ORAL at 23:10

## 2022-03-30 RX ADMIN — RIVAROXABAN 10 MILLIGRAM(S): KIT at 17:16

## 2022-03-30 RX ADMIN — HYDROMORPHONE HYDROCHLORIDE 0.5 MILLIGRAM(S): 2 INJECTION INTRAMUSCULAR; INTRAVENOUS; SUBCUTANEOUS at 12:10

## 2022-03-30 RX ADMIN — SODIUM CHLORIDE 125 MILLILITER(S): 9 INJECTION, SOLUTION INTRAVENOUS at 07:43

## 2022-03-30 NOTE — PROGRESS NOTE ADULT - SUBJECTIVE AND OBJECTIVE BOX
[facility] Division of Hospital Medicine  Uvaldo Jaquez MD  Pager (DUSTIN, 8A-5P): [phone]  Office:  [phone]    SUBJECTIVE / OVERNIGHT EVENTS: Yesterday, underwent left hip surgery. Noted to have expansion of PTX and left sided chest tube placed. Remained intubated in SICU post-operatively and was extubated just prior to my arrival. At present, reports no left hip pain. Continues to have pain in chest and back, exacerbated by deep breathing or coughing. No fever. No chills. No nausea/vomiting.    MEDICATIONS  (STANDING):  acetaminophen   IVPB .. 1000 milliGRAM(s) IV Intermittent every 6 hours  chlorhexidine 2% Cloths 1 Application(s) Topical <User Schedule>  insulin lispro (ADMELOG) corrective regimen sliding scale   SubCutaneous every 6 hours  lidocaine   4% Patch 1 Patch Transdermal every 24 hours  lidocaine   4% Patch 1 Patch Transdermal every 24 hours  rivaroxaban 10 milliGRAM(s) Oral two times a day    MEDICATIONS  (PRN):  HYDROmorphone  Injectable 0.5 milliGRAM(s) IV Push every 3 hours PRN Severe Breakthrough Pain      I&O's Summary    29 Mar 2022 07:01  -  30 Mar 2022 07:00  --------------------------------------------------------  IN: 1515.7 mL / OUT: 1510 mL / NET: 5.7 mL    30 Mar 2022 07:01  -  30 Mar 2022 14:30  --------------------------------------------------------  IN: 871.3 mL / OUT: 435 mL / NET: 436.3 mL        PHYSICAL EXAM:  Vital Signs Last 24 Hrs  T(C): 36.6 (30 Mar 2022 11:00), Max: 36.8 (29 Mar 2022 15:00)  T(F): 97.9 (30 Mar 2022 11:00), Max: 98.2 (29 Mar 2022 15:00)  HR: 105 (30 Mar 2022 13:00) (74 - 109)  BP: 116/58 (30 Mar 2022 13:00) (109/60 - 142/63)  BP(mean): 80 (30 Mar 2022 13:00) (77 - 97)  RR: 16 (30 Mar 2022 13:00) (13 - 28)  SpO2: 100% (30 Mar 2022 13:00) (94% - 100%)  CONSTITUTIONAL: Well-groomed, in no apparent distress  EYES: No conjunctival or scleral injection, non-icteric; PERRL and symmetric with horizontal nystagmus. Periorbital swelling and ecchymoses noted with raccoon eyes.  ENMT: Nasal splint in place. Diffuse swelling and erythema. No pharyngeal injection or exudates, oral mucosa with moist membranes  RESPIRATORY: Breathing comfortably; no dullness to percussion; lungs CTA without wheeze/rhonchi/rales  CARDIOVASCULAR: +S1S2, RRR, no M/G/R; no carotid bruits; pedal pulses full and symmetric; no lower extremity edema  CHEST/BREAST: Tenderness to palpation anteriorly over rib cage. Left chest wall chest tube noted.  GASTROINTESTINAL: No palpable masses or tenderness, +BS throughout, no rebound/guarding; no hepatosplenomegaly; no hernia palpated  MUSCULOSKELETAL: No digital clubbing or cyanosis; no paraspinal tenderness; LLE and RLE are equal length. Foot plantar/dorsiflexors 5/5 b/l.  SKIN: Diffuse ecchymoses noted. Left hip wound bandaged, consistent with surgery.   NEUROLOGIC: CN II-XII intact; normal reflexes in upper extremities; sensation intact in LEs b/l to light touch  PSYCHIATRIC: A+O x 3; mood and affect appropriate; appropriate insight and judgment    LABS:                        9.2    7.87  )-----------( 142      ( 30 Mar 2022 04:33 )             27.6     03-30    139  |  104  |  13  ----------------------------<  182<H>  4.6   |  24  |  0.92    Ca    8.3<L>      30 Mar 2022 04:33  Phos  4.0     03-30  Mg     2.2     03-30    TPro  6.7  /  Alb  4.4  /  TBili  0.3  /  DBili  x   /  AST  36  /  ALT  38  /  AlkPhos  79  03-28    PT/INR - ( 30 Mar 2022 04:33 )   PT: 13.4 sec;   INR: 1.15 ratio         PTT - ( 30 Mar 2022 04:33 )  PTT:25.2 sec      Urinalysis Basic - ( 28 Mar 2022 21:48 )    Color: Light Yellow / Appearance: Clear / S.046 / pH: x  Gluc: x / Ketone: Negative  / Bili: Negative / Urobili: Negative   Blood: x / Protein: 30 mg/dL / Nitrite: Negative   Leuk Esterase: Negative / RBC: 2 /hpf / WBC 2 /HPF   Sq Epi: x / Non Sq Epi: 1 /hpf / Bacteria: Negative      RADIOLOGY & ADDITIONAL TESTS:  Results Reviewed: Labs reviewed as above  Imaging Personally Reviewed: CXR: Clear lungs. Improved PTX.  Electrocardiogram Personally Reviewed: Telemetry monitor noting sinus tachycardia    COORDINATION OF CARE:  Care Discussed with Consultants/Other Providers [Y/N]:  Prior or Outpatient Records Reviewed [Y/N]:

## 2022-03-30 NOTE — DIETITIAN INITIAL EVALUATION ADULT. - OTHER CALCULATIONS
Calculations based on dosing wt 87kg, with consideration for intubation and BMI 29.2 kg/m2. The [place] State Equation (PSU) 2003b was used to calculate resting energy expenditure: 1824 kcal (21 kcal/kg)
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
## 2022-03-30 NOTE — DIETITIAN INITIAL EVALUATION ADULT. - OTHER INFO
GASTROINTESTINAL:  Last BM:   Bowel Regimen: Miralax, senna    NUTRITION STATUS:  - Propofol: current rate 15.66 ml/hr will provide 413 kcal from lipid in 24-hrs  - IVF: lactated ringers @ 125 ml/hr  - Hyperglycemia addressed with SSI q6 hrs    WEIGHT HISTORY: GASTROINTESTINAL:  Last BM: none noted  Bowel Regimen: none    NUTRITION STATUS:  - Propofol: current rate 15.66 ml/hr will provide 413 kcal from lipid in 24-hrs  - IVF: lactated ringers @ 125 ml/hr  - Hyperglycemia noted; addressed with SSI q6 hrs    WEIGHT HISTORY:  - Per HIE: 81.6kg (7/26/18)  - No recent weights available

## 2022-03-30 NOTE — PROGRESS NOTE ADULT - SUBJECTIVE AND OBJECTIVE BOX
ACS DAILY PROGRESS NOTE:     24 HOUR EVENTS: Cervical collar cleared    Patient seen and examined at bedside. Patient intubated and sedated. POD1 from ORIF fracture of left acetabulum    MEDICATIONS  (STANDING):  acetaminophen     Tablet .. 975 milliGRAM(s) Oral every 6 hours  chlorhexidine 2% Cloths 1 Application(s) Topical <User Schedule>  ibuprofen  Tablet. 400 milliGRAM(s) Oral every 6 hours  insulin lispro (ADMELOG) corrective regimen sliding scale   SubCutaneous every 6 hours  lactated ringers. 1000 milliLiter(s) (125 mL/Hr) IV Continuous <Continuous>  lidocaine   4% Patch 1 Patch Transdermal every 24 hours  lidocaine   4% Patch 1 Patch Transdermal every 24 hours  polyethylene glycol 3350 17 Gram(s) Oral daily  senna 2 Tablet(s) Oral at bedtime    MEDICATIONS  (PRN):  HYDROmorphone  Injectable 0.5 milliGRAM(s) IV Push every 3 hours PRN Severe Breakthrough Pain  oxyCODONE    IR 10 milliGRAM(s) Oral every 6 hours PRN Severe Pain (7 - 10)  oxyCODONE    IR 5 milliGRAM(s) Oral every 4 hours PRN Moderate Pain (4 - 6)      OBJECTIVE:    Vital Signs Last 24 Hrs  T(C): 37 (29 Mar 2022 03:00), Max: 37 (28 Mar 2022 19:38)  T(F): 98.6 (29 Mar 2022 03:00), Max: 98.6 (28 Mar 2022 19:38)  HR: 91 (29 Mar 2022 06:00) (84 - 103)  BP: 127/60 (29 Mar 2022 06:00) (127/60 - 154/82)  BP(mean): 87 (29 Mar 2022 06:00) (87 - 107)  RR: 13 (29 Mar 2022 06:00) (12 - 30)  SpO2: 93% (29 Mar 2022 06:00) (93% - 100%)        I&O's Detail    28 Mar 2022 07:01  -  29 Mar 2022 07:00  --------------------------------------------------------  IN:    IV PiggyBack: 550 mL    Lactated Ringers: 750 mL  Total IN: 1300 mL    OUT:    Voided (mL): 425 mL  Total OUT: 425 mL    Total NET: 875 mL          Daily Height in cm: 172.72 (28 Mar 2022 23:15)    Daily Weight in k (29 Mar 2022 00:00)    LABS:                        13.6   12.85 )-----------( 207      ( 29 Mar 2022 00:07 )             40.3     03    138  |  104  |  18  ----------------------------<  173<H>  4.2   |  21<L>  |  0.94    Ca    8.5      29 Mar 2022 00:07  Phos  2.3       Mg     1.9         TPro  6.7  /  Alb  4.4  /  TBili  0.3  /  DBili  x   /  AST  36  /  ALT  38  /  AlkPhos  79  03-28    PT/INR - ( 29 Mar 2022 04:18 )   PT: 12.3 sec;   INR: 1.07 ratio         PTT - ( 29 Mar 2022 04:18 )  PTT:24.3 sec  Urinalysis Basic - ( 28 Mar 2022 21:48 )    Color: Light Yellow / Appearance: Clear / S.046 / pH: x  Gluc: x / Ketone: Negative  / Bili: Negative / Urobili: Negative   Blood: x / Protein: 30 mg/dL / Nitrite: Negative   Leuk Esterase: Negative / RBC: 2 /hpf / WBC 2 /HPF   Sq Epi: x / Non Sq Epi: 1 /hpf / Bacteria: Negative      Physical Exam  Gen: NAD, A&Ox3  Pulm: Patient intubated and sedated.   CV: RRR, no JVD  Abd:  soft, nontender, nondistended, LUQ ecchymosis  Extremities:  FROM, warm and well perfused, can not assess mobilization or sensitivity.

## 2022-03-30 NOTE — PROGRESS NOTE ADULT - SUBJECTIVE AND OBJECTIVE BOX
Patient seen and evaluated this AM.  Pain under control. Extubated.  Able to move LLE. Chest tube placed.     ICU Vital Signs Last 24 Hrs  T(C): 36.9 (30 Mar 2022 19:00), Max: 37 (30 Mar 2022 15:00)  T(F): 98.4 (30 Mar 2022 19:00), Max: 98.6 (30 Mar 2022 15:00)  HR: 117 (30 Mar 2022 20:00) (74 - 117)  BP: 123/61 (30 Mar 2022 20:00) (109/60 - 136/70)  BP(mean): 84 (30 Mar 2022 20:00) (77 - 97)  ABP: 100/86 (30 Mar 2022 08:00) (86/54 - 142/78)  ABP(mean): 93 (30 Mar 2022 08:00) (66 - 104)  RR: 16 (30 Mar 2022 20:00) (12 - 28)  SpO2: 95% (30 Mar 2022 20:00) (95% - 100%)      PE:   Gen: resting comfortably  LLE:   Dressing c/d/i  SILT  Moving extremity  Able to plantar/dorsiflex ankle    L chest tube in place      Assessment: s/p L hip reduction + acetabular ORIF    Plan:  - Pain control PRN  - Appreciate SICU care  - TTWB LLE  - ANTONETTE reyes 3/31

## 2022-03-30 NOTE — DIETITIAN INITIAL EVALUATION ADULT. - CONTINUE CURRENT NUTRITION CARE PLAN
Initiate nutrition via tolerated route as medially feasible; recommend Carbohydrate Controlled PO/EN in setting of hyperglycemia/yes

## 2022-03-30 NOTE — DIETITIAN INITIAL EVALUATION ADULT. - PERTINENT LABORATORY DATA
03-30 @ 04:33: Sodium 139, Potassium 4.6, Calcium 8.3<L>, Magnesium 2.2, Phosphorus 4.0, BUN 13, Creatinine 0.92, Glucose 182<H>  Hemoglobin : 9.2 g/dL  Hematocrit : 27.6 %    A1C with Estimated Average Glucose Result: 5.4 % (03-29-22 @ 10:44)    POCT Blood Glucose.: 192 mg/dL (03-30-22 @ 05:53)  POCT Blood Glucose.: 116 mg/dL (03-29-22 @ 17:54)  POCT Blood Glucose.: 129 mg/dL (03-29-22 @ 12:09)     LIVER FUNCTIONS - ( 28 Mar 2022 20:05 )  Alb: 4.4 g/dL / Pro: 6.7 g/dL / ALK PHOS: 79 U/L / ALT: 38 U/L / AST: 36 U/L / GGT: x
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
## 2022-03-30 NOTE — OCCUPATIONAL THERAPY INITIAL EVALUATION ADULT - DIAGNOSIS, OT EVAL
Pt presents with pain, decreased ROM, strength, endurance, and balance, all impacting ability to perform ADLs and functional mobility.

## 2022-03-30 NOTE — DIETITIAN INITIAL EVALUATION ADULT. - PERTINENT MEDS FT
MEDICATIONS  (STANDING):  acetaminophen   IVPB .. 1000 milliGRAM(s) IV Intermittent every 6 hours  chlorhexidine 0.12% Liquid 15 milliLiter(s) Oral Mucosa every 12 hours  chlorhexidine 2% Cloths 1 Application(s) Topical <User Schedule>  insulin lispro (ADMELOG) corrective regimen sliding scale   SubCutaneous every 6 hours  lactated ringers. 1000 milliLiter(s) (125 mL/Hr) IV Continuous <Continuous>  lidocaine   4% Patch 1 Patch Transdermal every 24 hours  lidocaine   4% Patch 1 Patch Transdermal every 24 hours  pantoprazole  Injectable 40 milliGRAM(s) IV Push daily  propofol Infusion 30 MICROgram(s)/kG/Min (15.7 mL/Hr) IV Continuous <Continuous>

## 2022-03-30 NOTE — OCCUPATIONAL THERAPY INITIAL EVALUATION ADULT - PRECAUTIONS/LIMITATIONS, REHAB EVAL
Now s/p fx repair and dislocation reduction in OR, c/b enlarging ptx, chest tube placed at bedside.  No intervention required for right L2 transverse process fracture./fall precautions

## 2022-03-30 NOTE — OCCUPATIONAL THERAPY INITIAL EVALUATION ADULT - RANGE OF MOTION EXAMINATION, LOWER EXTREMITY
except L hip- limited by pain and hip precautions/bilateral LE Active ROM was WFL  (within functional limits)
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
## 2022-03-30 NOTE — DIETITIAN INITIAL EVALUATION ADULT. - REASON INDICATOR FOR ASSESSMENT
Nutrition Assessment warranted for length of stay on SICU  Information obtained from: medical record, communication with team. Pt intubated, sedated.

## 2022-03-30 NOTE — DIETITIAN INITIAL EVALUATION ADULT. - ENTERAL
If EN is warranted, initiate Glucerna 1.5 @ 10 ml/hr, advance as tolerated to goal 55 ml/hr x 24 hrs to provide 1320 ml formula, 1980 kcal (22.7 kcal/kg), 109 gm protein (1.25 gm/kg), 1002 ml free water; based on dosing wt 87kg, with consideratio for BMI 29.2kg/m2.

## 2022-03-30 NOTE — OCCUPATIONAL THERAPY INITIAL EVALUATION ADULT - ADL RETRAINING, OT EVAL
GOAL: Pt will perform LB dressing with modified independence using AE in 4 weeks GOAL: Patient will perform grooming standing sink level independently in 4 weeks

## 2022-03-30 NOTE — DIETITIAN INITIAL EVALUATION ADULT. - ORAL INTAKE PTA/DIET HISTORY
Diet History: unable to obtain  Per Patient Profile: swallows foods/liquids without difficulty  Allergies: NKFA  Home Nutrition Supplements: none noted
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
## 2022-03-30 NOTE — OCCUPATIONAL THERAPY INITIAL EVALUATION ADULT - NS ASR OT EQUIP NEEDS DISCH
RW, commode. Tub transfer bench to be purchased by patient.  OT to provide raised toilet seat and hip kit
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
## 2022-03-30 NOTE — AIRWAY REMOVAL NOTE  ADULT & PEDS - ARTIFICAL AIRWAY REMOVAL COMMENTS
Written order for extubation verified. The patient was identified by full name and birth date compared to the identification band. Present during the procedure was Eduardo Grubbs RN
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
## 2022-03-30 NOTE — PROGRESS NOTE ADULT - NS ATTEND OPT1 GEN_ALL_CORE
I attest my time as attending is greater than 50% of the total combined time spent on qualifying patient care activities by the PA/NP and attending.
I attest my time as attending is greater than 50% of the total combined time spent on qualifying patient care activities by the PA/NP and attending.

## 2022-03-31 LAB
ANION GAP SERPL CALC-SCNC: 6 MMOL/L — SIGNIFICANT CHANGE UP (ref 5–17)
BUN SERPL-MCNC: 11 MG/DL — SIGNIFICANT CHANGE UP (ref 7–23)
CALCIUM SERPL-MCNC: 8.1 MG/DL — LOW (ref 8.4–10.5)
CHLORIDE SERPL-SCNC: 104 MMOL/L — SIGNIFICANT CHANGE UP (ref 96–108)
CO2 SERPL-SCNC: 30 MMOL/L — SIGNIFICANT CHANGE UP (ref 22–31)
CREAT SERPL-MCNC: 0.88 MG/DL — SIGNIFICANT CHANGE UP (ref 0.5–1.3)
EGFR: 100 ML/MIN/1.73M2 — SIGNIFICANT CHANGE UP
GLUCOSE SERPL-MCNC: 126 MG/DL — HIGH (ref 70–99)
HCT VFR BLD CALC: 24.9 % — LOW (ref 39–50)
HCT VFR BLD CALC: 27.5 % — LOW (ref 39–50)
HGB BLD-MCNC: 8.4 G/DL — LOW (ref 13–17)
HGB BLD-MCNC: 9.1 G/DL — LOW (ref 13–17)
MAGNESIUM SERPL-MCNC: 2 MG/DL — SIGNIFICANT CHANGE UP (ref 1.6–2.6)
MCHC RBC-ENTMCNC: 30.5 PG — SIGNIFICANT CHANGE UP (ref 27–34)
MCHC RBC-ENTMCNC: 30.8 PG — SIGNIFICANT CHANGE UP (ref 27–34)
MCHC RBC-ENTMCNC: 33.1 GM/DL — SIGNIFICANT CHANGE UP (ref 32–36)
MCHC RBC-ENTMCNC: 33.7 GM/DL — SIGNIFICANT CHANGE UP (ref 32–36)
MCV RBC AUTO: 91.2 FL — SIGNIFICANT CHANGE UP (ref 80–100)
MCV RBC AUTO: 92.3 FL — SIGNIFICANT CHANGE UP (ref 80–100)
NRBC # BLD: 0 /100 WBCS — SIGNIFICANT CHANGE UP (ref 0–0)
NRBC # BLD: 0 /100 WBCS — SIGNIFICANT CHANGE UP (ref 0–0)
PHOSPHATE SERPL-MCNC: 1.8 MG/DL — LOW (ref 2.5–4.5)
PLATELET # BLD AUTO: 144 K/UL — LOW (ref 150–400)
PLATELET # BLD AUTO: 172 K/UL — SIGNIFICANT CHANGE UP (ref 150–400)
POTASSIUM SERPL-MCNC: 3.9 MMOL/L — SIGNIFICANT CHANGE UP (ref 3.5–5.3)
POTASSIUM SERPL-SCNC: 3.9 MMOL/L — SIGNIFICANT CHANGE UP (ref 3.5–5.3)
RBC # BLD: 2.73 M/UL — LOW (ref 4.2–5.8)
RBC # BLD: 2.98 M/UL — LOW (ref 4.2–5.8)
RBC # FLD: 12.9 % — SIGNIFICANT CHANGE UP (ref 10.3–14.5)
RBC # FLD: 13 % — SIGNIFICANT CHANGE UP (ref 10.3–14.5)
SODIUM SERPL-SCNC: 140 MMOL/L — SIGNIFICANT CHANGE UP (ref 135–145)
WBC # BLD: 6.78 K/UL — SIGNIFICANT CHANGE UP (ref 3.8–10.5)
WBC # BLD: 7.29 K/UL — SIGNIFICANT CHANGE UP (ref 3.8–10.5)
WBC # FLD AUTO: 6.78 K/UL — SIGNIFICANT CHANGE UP (ref 3.8–10.5)
WBC # FLD AUTO: 7.29 K/UL — SIGNIFICANT CHANGE UP (ref 3.8–10.5)

## 2022-03-31 PROCEDURE — 71045 X-RAY EXAM CHEST 1 VIEW: CPT | Mod: 26

## 2022-03-31 PROCEDURE — 71045 X-RAY EXAM CHEST 1 VIEW: CPT | Mod: 26,77,76

## 2022-03-31 PROCEDURE — 99233 SBSQ HOSP IP/OBS HIGH 50: CPT

## 2022-03-31 PROCEDURE — 99232 SBSQ HOSP IP/OBS MODERATE 35: CPT | Mod: GC

## 2022-03-31 RX ORDER — OXYCODONE HYDROCHLORIDE 5 MG/1
10 TABLET ORAL EVERY 4 HOURS
Refills: 0 | Status: DISCONTINUED | OUTPATIENT
Start: 2022-03-31 | End: 2022-04-04

## 2022-03-31 RX ORDER — OXYCODONE HYDROCHLORIDE 5 MG/1
5 TABLET ORAL ONCE
Refills: 0 | Status: DISCONTINUED | OUTPATIENT
Start: 2022-03-31 | End: 2022-03-31

## 2022-03-31 RX ORDER — POTASSIUM PHOSPHATE, MONOBASIC POTASSIUM PHOSPHATE, DIBASIC 236; 224 MG/ML; MG/ML
30 INJECTION, SOLUTION INTRAVENOUS ONCE
Refills: 0 | Status: COMPLETED | OUTPATIENT
Start: 2022-03-31 | End: 2022-03-31

## 2022-03-31 RX ORDER — ACETAMINOPHEN 500 MG
975 TABLET ORAL EVERY 6 HOURS
Refills: 0 | Status: DISCONTINUED | OUTPATIENT
Start: 2022-03-31 | End: 2022-04-04

## 2022-03-31 RX ADMIN — OXYCODONE HYDROCHLORIDE 5 MILLIGRAM(S): 5 TABLET ORAL at 12:20

## 2022-03-31 RX ADMIN — OXYCODONE HYDROCHLORIDE 10 MILLIGRAM(S): 5 TABLET ORAL at 19:57

## 2022-03-31 RX ADMIN — RIVAROXABAN 10 MILLIGRAM(S): KIT at 17:52

## 2022-03-31 RX ADMIN — HYDROMORPHONE HYDROCHLORIDE 0.5 MILLIGRAM(S): 2 INJECTION INTRAMUSCULAR; INTRAVENOUS; SUBCUTANEOUS at 06:47

## 2022-03-31 RX ADMIN — Medication 1 DROP(S): at 23:23

## 2022-03-31 RX ADMIN — Medication 1 DROP(S): at 17:52

## 2022-03-31 RX ADMIN — LIDOCAINE 1 PATCH: 4 CREAM TOPICAL at 07:14

## 2022-03-31 RX ADMIN — Medication 975 MILLIGRAM(S): at 10:37

## 2022-03-31 RX ADMIN — OXYCODONE HYDROCHLORIDE 5 MILLIGRAM(S): 5 TABLET ORAL at 12:50

## 2022-03-31 RX ADMIN — Medication 1 DROP(S): at 05:59

## 2022-03-31 RX ADMIN — OXYCODONE HYDROCHLORIDE 5 MILLIGRAM(S): 5 TABLET ORAL at 03:01

## 2022-03-31 RX ADMIN — POLYETHYLENE GLYCOL 3350 17 GRAM(S): 17 POWDER, FOR SOLUTION ORAL at 11:34

## 2022-03-31 RX ADMIN — OXYCODONE HYDROCHLORIDE 10 MILLIGRAM(S): 5 TABLET ORAL at 19:27

## 2022-03-31 RX ADMIN — Medication 1 DROP(S): at 11:34

## 2022-03-31 RX ADMIN — OXYCODONE HYDROCHLORIDE 10 MILLIGRAM(S): 5 TABLET ORAL at 15:05

## 2022-03-31 RX ADMIN — Medication 250 MILLIMOLE(S): at 04:20

## 2022-03-31 RX ADMIN — OXYCODONE HYDROCHLORIDE 10 MILLIGRAM(S): 5 TABLET ORAL at 15:35

## 2022-03-31 RX ADMIN — HYDROMORPHONE HYDROCHLORIDE 0.5 MILLIGRAM(S): 2 INJECTION INTRAMUSCULAR; INTRAVENOUS; SUBCUTANEOUS at 06:32

## 2022-03-31 RX ADMIN — LIDOCAINE 1 PATCH: 4 CREAM TOPICAL at 17:48

## 2022-03-31 RX ADMIN — OXYCODONE HYDROCHLORIDE 5 MILLIGRAM(S): 5 TABLET ORAL at 03:41

## 2022-03-31 RX ADMIN — RIVAROXABAN 10 MILLIGRAM(S): KIT at 06:32

## 2022-03-31 RX ADMIN — Medication 400 MILLIGRAM(S): at 04:30

## 2022-03-31 RX ADMIN — Medication 975 MILLIGRAM(S): at 21:54

## 2022-03-31 RX ADMIN — POTASSIUM PHOSPHATE, MONOBASIC POTASSIUM PHOSPHATE, DIBASIC 83.33 MILLIMOLE(S): 236; 224 INJECTION, SOLUTION INTRAVENOUS at 05:59

## 2022-03-31 RX ADMIN — Medication 975 MILLIGRAM(S): at 10:07

## 2022-03-31 RX ADMIN — CHLORHEXIDINE GLUCONATE 1 APPLICATION(S): 213 SOLUTION TOPICAL at 05:53

## 2022-03-31 RX ADMIN — Medication 1000 MILLIGRAM(S): at 04:45

## 2022-03-31 RX ADMIN — LIDOCAINE 1 PATCH: 4 CREAM TOPICAL at 05:58

## 2022-03-31 RX ADMIN — LIDOCAINE 1 PATCH: 4 CREAM TOPICAL at 05:59

## 2022-03-31 RX ADMIN — SENNA PLUS 2 TABLET(S): 8.6 TABLET ORAL at 21:55

## 2022-03-31 RX ADMIN — OXYCODONE HYDROCHLORIDE 5 MILLIGRAM(S): 5 TABLET ORAL at 09:32

## 2022-03-31 RX ADMIN — OXYCODONE HYDROCHLORIDE 5 MILLIGRAM(S): 5 TABLET ORAL at 10:02

## 2022-03-31 NOTE — PROGRESS NOTE ADULT - SUBJECTIVE AND OBJECTIVE BOX
Patient seen and evaluated this AM.  Pain under control. Able to move LLE. Chest tube placed.     ICU Vital Signs Last 24 Hrs  T(C): 37 (31 Mar 2022 03:00), Max: 37.2 (30 Mar 2022 23:00)  T(F): 98.6 (31 Mar 2022 03:00), Max: 99 (30 Mar 2022 23:00)  HR: 101 (31 Mar 2022 05:00) (76 - 117)  BP: 113/62 (31 Mar 2022 05:00) (107/61 - 136/70)  BP(mean): 81 (31 Mar 2022 05:00) (76 - 97)  ABP: 100/86 (30 Mar 2022 08:00) (86/54 - 100/86)  ABP(mean): 93 (30 Mar 2022 08:00) (66 - 93)  RR: 15 (31 Mar 2022 05:00) (12 - 19)  SpO2: 98% (31 Mar 2022 05:00) (95% - 100%)      PE:   Gen: resting comfortably  LLE:   Dressing c/d/i  SILT  Moving extremity  Able to plantar/dorsiflex ankle    L chest tube in place      Assessment: s/p L hip reduction + acetabular ORIF    Plan:  - Pain control PRN  - Appreciate SICU care  - TTWB LLE  - ANTONETTE reyes 3/31
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
## 2022-03-31 NOTE — PROGRESS NOTE ADULT - SUBJECTIVE AND OBJECTIVE BOX
Surgery Progress Note     Subjective/24hour Events: Patient seen and examined at the bedside this morning. No acute events overnight. Pain controlled.     Vital Signs:  Vital Signs Last 24 Hrs  T(C): 37.2 (30 Mar 2022 23:00), Max: 37.2 (30 Mar 2022 23:00)  T(F): 99 (30 Mar 2022 23:00), Max: 99 (30 Mar 2022 23:00)  HR: 100 (31 Mar 2022 02:00) (74 - 117)  BP: 114/57 (31 Mar 2022 02:00) (109/60 - 136/70)  BP(mean): 76 (31 Mar 2022 02:00) (76 - 97)  RR: 14 (31 Mar 2022 02:00) (12 - 28)  SpO2: 97% (31 Mar 2022 02:00) (95% - 100%)        I&O's Detail    29 Mar 2022 07:01  -  30 Mar 2022 07:00  --------------------------------------------------------  IN:    Lactated Ringers: 125 mL    Lactated Ringers: 1375 mL    Propofol: 15.7 mL  Total IN: 1515.7 mL    OUT:    Indwelling Catheter - Urethral (mL): 160 mL    Voided (mL): 1350 mL  Total OUT: 1510 mL    Total NET: 5.7 mL      30 Mar 2022 07:01  -  31 Mar 2022 02:50  --------------------------------------------------------  IN:    IV PiggyBack: 200 mL    Lactated Ringers: 500 mL    Oral Fluid: 1410 mL    Propofol: 31.3 mL  Total IN: 2141.3 mL    OUT:    Indwelling Catheter - Urethral (mL): 465 mL    Voided (mL): 950 mL  Total OUT: 1415 mL    Total NET: 726.3 mL    Physical Exam  Gen: NAD, A&Ox3  Pulm: Patient intubated and sedated.   CV: RRR, no JVD  Abd:  soft, nontender, nondistended, LUQ ecchymosis  Extremities:  FROM, warm and well perfused, can not assess mobilization or sensitivity.     Labs:    03-30    139  |  104  |  13  ----------------------------<  182<H>  4.6   |  24  |  0.92    Ca    8.3<L>      30 Mar 2022 04:33  Phos  4.0     03-30  Mg     2.2     03-30      CAPILLARY BLOOD GLUCOSE      POCT Blood Glucose.: 138 mg/dL (30 Mar 2022 17:20)  POCT Blood Glucose.: 135 mg/dL (30 Mar 2022 12:08)  POCT Blood Glucose.: 192 mg/dL (30 Mar 2022 05:53)                            9.2    7.87  )-----------( 142      ( 30 Mar 2022 04:33 )             27.6     PT/INR - ( 30 Mar 2022 04:33 )   PT: 13.4 sec;   INR: 1.15 ratio         PTT - ( 30 Mar 2022 04:33 )  PTT:25.2 sec       Surgery Progress Note     24 HOUR EVENTS:  - Started on a regular diet  - Started on xarelto for DVT phx as per orthopedics    Subjective/24hour Events: Patient seen and examined at the bedside this morning. No acute events overnight. Pain controlled.     Vital Signs:  Vital Signs Last 24 Hrs  T(C): 37.2 (30 Mar 2022 23:00), Max: 37.2 (30 Mar 2022 23:00)  T(F): 99 (30 Mar 2022 23:00), Max: 99 (30 Mar 2022 23:00)  HR: 100 (31 Mar 2022 02:00) (74 - 117)  BP: 114/57 (31 Mar 2022 02:00) (109/60 - 136/70)  BP(mean): 76 (31 Mar 2022 02:00) (76 - 97)  RR: 14 (31 Mar 2022 02:00) (12 - 28)  SpO2: 97% (31 Mar 2022 02:00) (95% - 100%)        I&O's Detail    29 Mar 2022 07:01  -  30 Mar 2022 07:00  --------------------------------------------------------  IN:    Lactated Ringers: 125 mL    Lactated Ringers: 1375 mL    Propofol: 15.7 mL  Total IN: 1515.7 mL    OUT:    Indwelling Catheter - Urethral (mL): 160 mL    Voided (mL): 1350 mL  Total OUT: 1510 mL    Total NET: 5.7 mL      30 Mar 2022 07:01  -  31 Mar 2022 02:50  --------------------------------------------------------  IN:    IV PiggyBack: 200 mL    Lactated Ringers: 500 mL    Oral Fluid: 1410 mL    Propofol: 31.3 mL  Total IN: 2141.3 mL    OUT:    Indwelling Catheter - Urethral (mL): 465 mL    Voided (mL): 950 mL  Total OUT: 1415 mL    Total NET: 726.3 mL    Physical Exam  Gen: NAD, A&Ox3  Pulm: Patient intubated and sedated.   CV: RRR, no JVD  Abd:  soft, nontender, nondistended, LUQ ecchymosis  Extremities:  FROM, warm and well perfused, can not assess mobilization or sensitivity.     Labs:    03-30    139  |  104  |  13  ----------------------------<  182<H>  4.6   |  24  |  0.92    Ca    8.3<L>      30 Mar 2022 04:33  Phos  4.0     03-30  Mg     2.2     03-30      CAPILLARY BLOOD GLUCOSE      POCT Blood Glucose.: 138 mg/dL (30 Mar 2022 17:20)  POCT Blood Glucose.: 135 mg/dL (30 Mar 2022 12:08)  POCT Blood Glucose.: 192 mg/dL (30 Mar 2022 05:53)                            9.2    7.87  )-----------( 142      ( 30 Mar 2022 04:33 )             27.6     PT/INR - ( 30 Mar 2022 04:33 )   PT: 13.4 sec;   INR: 1.15 ratio         PTT - ( 30 Mar 2022 04:33 )  PTT:25.2 sec       Surgery Progress Note     24 HOUR EVENTS:  - Started on a regular diet  - Started on xarelto for DVT ppx as per orthopedics    Subjective/24hour Events: Patient seen and examined at the bedside this morning. No acute events overnight. Pain controlled.     Vital Signs:  Vital Signs Last 24 Hrs  T(C): 37.2 (30 Mar 2022 23:00), Max: 37.2 (30 Mar 2022 23:00)  T(F): 99 (30 Mar 2022 23:00), Max: 99 (30 Mar 2022 23:00)  HR: 100 (31 Mar 2022 02:00) (74 - 117)  BP: 114/57 (31 Mar 2022 02:00) (109/60 - 136/70)  BP(mean): 76 (31 Mar 2022 02:00) (76 - 97)  RR: 14 (31 Mar 2022 02:00) (12 - 28)  SpO2: 97% (31 Mar 2022 02:00) (95% - 100%)        I&O's Detail    29 Mar 2022 07:01  -  30 Mar 2022 07:00  --------------------------------------------------------  IN:    Lactated Ringers: 125 mL    Lactated Ringers: 1375 mL    Propofol: 15.7 mL  Total IN: 1515.7 mL    OUT:    Indwelling Catheter - Urethral (mL): 160 mL    Voided (mL): 1350 mL  Total OUT: 1510 mL    Total NET: 5.7 mL      30 Mar 2022 07:01  -  31 Mar 2022 02:50  --------------------------------------------------------  IN:    IV PiggyBack: 200 mL    Lactated Ringers: 500 mL    Oral Fluid: 1410 mL    Propofol: 31.3 mL  Total IN: 2141.3 mL    OUT:    Indwelling Catheter - Urethral (mL): 465 mL    Voided (mL): 950 mL  Total OUT: 1415 mL    Total NET: 726.3 mL    Physical Exam  Gen: NAD  HEENT: lip lac repaired, nasal bridge w dressing in place   Respiratory: no increased work of breathing, L CT LCWS, no air leak   Abd:  soft, nontender, nondistended, LUQ ecchymosis  Extremities:  warm and well perfused, can not assess mobilization or sensitivity.     Labs:    03-30    139  |  104  |  13  ----------------------------<  182<H>  4.6   |  24  |  0.92    Ca    8.3<L>      30 Mar 2022 04:33  Phos  4.0     03-30  Mg     2.2     03-30      CAPILLARY BLOOD GLUCOSE      POCT Blood Glucose.: 138 mg/dL (30 Mar 2022 17:20)  POCT Blood Glucose.: 135 mg/dL (30 Mar 2022 12:08)  POCT Blood Glucose.: 192 mg/dL (30 Mar 2022 05:53)                            9.2    7.87  )-----------( 142      ( 30 Mar 2022 04:33 )             27.6     PT/INR - ( 30 Mar 2022 04:33 )   PT: 13.4 sec;   INR: 1.15 ratio         PTT - ( 30 Mar 2022 04:33 )  PTT:25.2 sec
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
## 2022-03-31 NOTE — PROGRESS NOTE ADULT - SUBJECTIVE AND OBJECTIVE BOX
[facility] Division of Hospital Medicine  Uvaldo Jaquez MD  Pager (DUSTIN, 8A-5P): [phone]  Office:  [phone]    SUBJECTIVE / OVERNIGHT EVENTS: Continues to report pain, mostly in ribs, this morning. Worse with movement and deep breathing. No significant pain at left hip. No SOB. No fever/chills. No nausea/vomiting. + constipation, no BM since admission.    MEDICATIONS  (STANDING):  artificial tears (preservative free) Ophthalmic Solution 1 Drop(s) Left EYE four times a day  chlorhexidine 2% Cloths 1 Application(s) Topical <User Schedule>  lidocaine   4% Patch 1 Patch Transdermal every 24 hours  lidocaine   4% Patch 1 Patch Transdermal every 24 hours  polyethylene glycol 3350 17 Gram(s) Oral daily  rivaroxaban 10 milliGRAM(s) Oral two times a day  senna 2 Tablet(s) Oral at bedtime    MEDICATIONS  (PRN):  acetaminophen     Tablet .. 975 milliGRAM(s) Oral every 6 hours PRN Mild Pain (1 - 3)  oxyCODONE    IR 10 milliGRAM(s) Oral every 4 hours PRN Severe Pain (7 - 10)  oxyCODONE    IR 5 milliGRAM(s) Oral every 4 hours PRN Moderate Pain (4 - 6)      I&O's Summary    30 Mar 2022 07:01  -  31 Mar 2022 07:00  --------------------------------------------------------  IN: 2674.7 mL / OUT: 2420 mL / NET: 254.7 mL    31 Mar 2022 07:01  -  31 Mar 2022 14:04  --------------------------------------------------------  IN: 1136.7 mL / OUT: 400 mL / NET: 736.7 mL        PHYSICAL EXAM:  Vital Signs Last 24 Hrs  T(C): 36.6 (31 Mar 2022 11:00), Max: 37.4 (31 Mar 2022 07:00)  T(F): 97.9 (31 Mar 2022 11:00), Max: 99.3 (31 Mar 2022 07:00)  HR: 105 (31 Mar 2022 13:00) (97 - 117)  BP: 123/60 (31 Mar 2022 13:00) (105/52 - 132/61)  BP(mean): 84 (31 Mar 2022 13:00) (75 - 90)  RR: 16 (31 Mar 2022 13:00) (12 - 25)  SpO2: 93% (31 Mar 2022 13:00) (93% - 100%)  CONSTITUTIONAL: Well-groomed, in no apparent distress  EYES: No conjunctival or scleral injection, non-icteric; PERRL and symmetric with horizontal nystagmus. Periorbital swelling and ecchymoses noted with raccoon eyes, which is improved.  ENMT: Nasal splint in place. Diffuse swelling and erythema.  RESPIRATORY: Breathing comfortably; lungs CTA without wheeze/rhonchi/rales  CARDIOVASCULAR: +S1S2, RRR, no M/G/R; no carotid bruits; pedal pulses full and symmetric; no lower extremity edema  CHEST/BREAST: Tenderness to palpation anteriorly over rib cage. Left chest wall chest tube noted.  GASTROINTESTINAL: No palpable masses or tenderness, +BS throughout, no rebound/guarding; no hepatosplenomegaly; no hernia palpated  MUSCULOSKELETAL: No digital clubbing or cyanosis; no paraspinal tenderness; LLE and RLE are equal length. Foot plantar/dorsiflexors 5/5 b/l.  SKIN: Diffuse ecchymoses noted. Left hip wound bandaged, consistent with surgery.   NEUROLOGIC: CN II-XII intact; normal reflexes in upper extremities; sensation intact in LEs b/l to light touch  PSYCHIATRIC: A+O x 3; mood and affect appropriate; appropriate insight and judgment    LABS:                        9.1    7.29  )-----------( 144      ( 31 Mar 2022 03:26 )             27.5     03-31    140  |  104  |  11  ----------------------------<  126<H>  3.9   |  30  |  0.88    Ca    8.1<L>      31 Mar 2022 03:26  Phos  1.8     03-31  Mg     2.0     03-31      PT/INR - ( 30 Mar 2022 04:33 )   PT: 13.4 sec;   INR: 1.15 ratio         PTT - ( 30 Mar 2022 04:33 )  PTT:25.2 sec    RADIOLOGY & ADDITIONAL TESTS:  Results Reviewed: Labs reviewed as above  Imaging Personally Reviewed: No new studies  Electrocardiogram Personally Reviewed: No new studies    COORDINATION OF CARE:  Care Discussed with Consultants/Other Providers [Y]: Dr. Nicholas regarding need for bowel regimen
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
## 2022-04-01 LAB
ANION GAP SERPL CALC-SCNC: 6 MMOL/L — SIGNIFICANT CHANGE UP (ref 5–17)
BLD GP AB SCN SERPL QL: NEGATIVE — SIGNIFICANT CHANGE UP
BUN SERPL-MCNC: 12 MG/DL — SIGNIFICANT CHANGE UP (ref 7–23)
CALCIUM SERPL-MCNC: 8.3 MG/DL — LOW (ref 8.4–10.5)
CHLORIDE SERPL-SCNC: 102 MMOL/L — SIGNIFICANT CHANGE UP (ref 96–108)
CO2 SERPL-SCNC: 30 MMOL/L — SIGNIFICANT CHANGE UP (ref 22–31)
CREAT SERPL-MCNC: 0.83 MG/DL — SIGNIFICANT CHANGE UP (ref 0.5–1.3)
EGFR: 102 ML/MIN/1.73M2 — SIGNIFICANT CHANGE UP
GLUCOSE SERPL-MCNC: 123 MG/DL — HIGH (ref 70–99)
MAGNESIUM SERPL-MCNC: 2.1 MG/DL — SIGNIFICANT CHANGE UP (ref 1.6–2.6)
PHOSPHATE SERPL-MCNC: 2.7 MG/DL — SIGNIFICANT CHANGE UP (ref 2.5–4.5)
POTASSIUM SERPL-MCNC: 3.6 MMOL/L — SIGNIFICANT CHANGE UP (ref 3.5–5.3)
POTASSIUM SERPL-SCNC: 3.6 MMOL/L — SIGNIFICANT CHANGE UP (ref 3.5–5.3)
RH IG SCN BLD-IMP: NEGATIVE — SIGNIFICANT CHANGE UP
SODIUM SERPL-SCNC: 138 MMOL/L — SIGNIFICANT CHANGE UP (ref 135–145)

## 2022-04-01 PROCEDURE — 71045 X-RAY EXAM CHEST 1 VIEW: CPT | Mod: 26

## 2022-04-01 PROCEDURE — 99233 SBSQ HOSP IP/OBS HIGH 50: CPT

## 2022-04-01 PROCEDURE — 99232 SBSQ HOSP IP/OBS MODERATE 35: CPT | Mod: GC

## 2022-04-01 RX ORDER — POTASSIUM CHLORIDE 20 MEQ
40 PACKET (EA) ORAL ONCE
Refills: 0 | Status: COMPLETED | OUTPATIENT
Start: 2022-04-01 | End: 2022-04-01

## 2022-04-01 RX ORDER — POLYETHYLENE GLYCOL 3350 17 G/17G
17 POWDER, FOR SOLUTION ORAL EVERY 12 HOURS
Refills: 0 | Status: DISCONTINUED | OUTPATIENT
Start: 2022-04-01 | End: 2022-04-04

## 2022-04-01 RX ORDER — POTASSIUM PHOSPHATE, MONOBASIC POTASSIUM PHOSPHATE, DIBASIC 236; 224 MG/ML; MG/ML
15 INJECTION, SOLUTION INTRAVENOUS ONCE
Refills: 0 | Status: COMPLETED | OUTPATIENT
Start: 2022-04-01 | End: 2022-04-01

## 2022-04-01 RX ADMIN — CHLORHEXIDINE GLUCONATE 1 APPLICATION(S): 213 SOLUTION TOPICAL at 05:31

## 2022-04-01 RX ADMIN — Medication 975 MILLIGRAM(S): at 10:34

## 2022-04-01 RX ADMIN — OXYCODONE HYDROCHLORIDE 10 MILLIGRAM(S): 5 TABLET ORAL at 15:44

## 2022-04-01 RX ADMIN — OXYCODONE HYDROCHLORIDE 10 MILLIGRAM(S): 5 TABLET ORAL at 10:34

## 2022-04-01 RX ADMIN — RIVAROXABAN 10 MILLIGRAM(S): KIT at 17:28

## 2022-04-01 RX ADMIN — LIDOCAINE 1 PATCH: 4 CREAM TOPICAL at 17:32

## 2022-04-01 RX ADMIN — OXYCODONE HYDROCHLORIDE 10 MILLIGRAM(S): 5 TABLET ORAL at 05:37

## 2022-04-01 RX ADMIN — LIDOCAINE 1 PATCH: 4 CREAM TOPICAL at 08:08

## 2022-04-01 RX ADMIN — POTASSIUM PHOSPHATE, MONOBASIC POTASSIUM PHOSPHATE, DIBASIC 62.5 MILLIMOLE(S): 236; 224 INJECTION, SOLUTION INTRAVENOUS at 05:37

## 2022-04-01 RX ADMIN — OXYCODONE HYDROCHLORIDE 10 MILLIGRAM(S): 5 TABLET ORAL at 05:07

## 2022-04-01 RX ADMIN — OXYCODONE HYDROCHLORIDE 10 MILLIGRAM(S): 5 TABLET ORAL at 16:14

## 2022-04-01 RX ADMIN — OXYCODONE HYDROCHLORIDE 5 MILLIGRAM(S): 5 TABLET ORAL at 20:23

## 2022-04-01 RX ADMIN — OXYCODONE HYDROCHLORIDE 5 MILLIGRAM(S): 5 TABLET ORAL at 20:53

## 2022-04-01 RX ADMIN — POLYETHYLENE GLYCOL 3350 17 GRAM(S): 17 POWDER, FOR SOLUTION ORAL at 21:14

## 2022-04-01 RX ADMIN — Medication 975 MILLIGRAM(S): at 22:22

## 2022-04-01 RX ADMIN — POLYETHYLENE GLYCOL 3350 17 GRAM(S): 17 POWDER, FOR SOLUTION ORAL at 12:00

## 2022-04-01 RX ADMIN — Medication 975 MILLIGRAM(S): at 16:25

## 2022-04-01 RX ADMIN — SENNA PLUS 2 TABLET(S): 8.6 TABLET ORAL at 21:14

## 2022-04-01 RX ADMIN — Medication 1 DROP(S): at 17:28

## 2022-04-01 RX ADMIN — Medication 1 DROP(S): at 05:08

## 2022-04-01 RX ADMIN — Medication 40 MILLIEQUIVALENT(S): at 06:56

## 2022-04-01 RX ADMIN — Medication 975 MILLIGRAM(S): at 10:04

## 2022-04-01 RX ADMIN — Medication 1 DROP(S): at 12:58

## 2022-04-01 RX ADMIN — OXYCODONE HYDROCHLORIDE 10 MILLIGRAM(S): 5 TABLET ORAL at 10:04

## 2022-04-01 RX ADMIN — LIDOCAINE 1 PATCH: 4 CREAM TOPICAL at 05:08

## 2022-04-01 RX ADMIN — RIVAROXABAN 10 MILLIGRAM(S): KIT at 05:08

## 2022-04-01 RX ADMIN — Medication 975 MILLIGRAM(S): at 16:55
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
## 2022-04-01 NOTE — PROGRESS NOTE ADULT - SUBJECTIVE AND OBJECTIVE BOX
ORTHOPAEDICS DAILY PROGRESS NOTE:       SUBJECTIVE/ROS: POD 3. Seen and examined. Pain well controlled. Has been working with Pt. Efrain CP/SOB/N/V.          MEDICATIONS  (STANDING):  artificial tears (preservative free) Ophthalmic Solution 1 Drop(s) Left EYE four times a day  chlorhexidine 2% Cloths 1 Application(s) Topical <User Schedule>  lidocaine   4% Patch 1 Patch Transdermal every 24 hours  lidocaine   4% Patch 1 Patch Transdermal every 24 hours  polyethylene glycol 3350 17 Gram(s) Oral daily  rivaroxaban 10 milliGRAM(s) Oral two times a day  senna 2 Tablet(s) Oral at bedtime    MEDICATIONS  (PRN):  acetaminophen     Tablet .. 975 milliGRAM(s) Oral every 6 hours PRN Mild Pain (1 - 3)  oxyCODONE    IR 5 milliGRAM(s) Oral every 4 hours PRN Moderate Pain (4 - 6)  oxyCODONE    IR 10 milliGRAM(s) Oral every 4 hours PRN Severe Pain (7 - 10)      OBJECTIVE:    Vital Signs Last 24 Hrs  T(C): 36.8 (01 Apr 2022 03:00), Max: 37.4 (31 Mar 2022 07:00)  T(F): 98.2 (01 Apr 2022 03:00), Max: 99.3 (31 Mar 2022 07:00)  HR: 108 (01 Apr 2022 05:00) (95 - 118)  BP: 115/59 (01 Apr 2022 05:00) (103/55 - 135/68)  BP(mean): 79 (01 Apr 2022 05:00) (73 - 95)  RR: 18 (01 Apr 2022 05:00) (12 - 25)  SpO2: 92% (01 Apr 2022 05:00) (92% - 99%)    I&O's Detail    30 Mar 2022 07:01  -  31 Mar 2022 07:00  --------------------------------------------------------  IN:    IV PiggyBack: 283.3 mL    Lactated Ringers: 500 mL    Oral Fluid: 1860 mL    Propofol: 31.3 mL  Total IN: 2674.7 mL    OUT:    Chest Tube (mL): 5 mL    Indwelling Catheter - Urethral (mL): 465 mL    Voided (mL): 1950 mL  Total OUT: 2420 mL    Total NET: 254.7 mL      31 Mar 2022 07:01  -  01 Apr 2022 06:02  --------------------------------------------------------  IN:    IV PiggyBack: 416.7 mL    Oral Fluid: 2235 mL  Total IN: 2651.7 mL    OUT:    Chest Tube (mL): 25 mL    Voided (mL): 2800 mL  Total OUT: 2825 mL    Total NET: -173.3 mL          Daily     Daily     LABS:                        8.4    6.78  )-----------( 172      ( 31 Mar 2022 23:36 )             24.9     03-31    138  |  102  |  12  ----------------------------<  123<H>  3.6   |  30  |  0.83    Ca    8.3<L>      31 Mar 2022 23:36  Phos  2.7     03-31  Mg     2.1     03-31                    PHYSICAL EXAM:  nad  nonlabored resp  lle  abd pillow  dressing c/d/i  +gastroct/ta/ehl/fhl  silt s/s/sp/dp/t  +dp
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
## 2022-04-01 NOTE — PROGRESS NOTE ADULT - ASSESSMENT
Assessment: s/p L hip reduction + acetabular ORIF    Plan:  - Pain control PRN  - Appreciate SICU care  - TTWB LLE  - Xarelto

## 2022-04-01 NOTE — PROVIDER CONTACT NOTE (OTHER) - ACTION/TREATMENT ORDERED:
Provider came to the bedside to examine patient and an urgent chest x-ray to be ordered to verify status of L lung. No interventions necessary at this time. Safety maintained.

## 2022-04-01 NOTE — PROGRESS NOTE ADULT - NS ATTEST RISK GEN_ALL_CORE
Risk Statement (NON-critical care)

## 2022-04-01 NOTE — PROGRESS NOTE ADULT - SUBJECTIVE AND OBJECTIVE BOX
[facility] Division of Hospital Medicine  Uvaldo Jaquez MD  Pager (DUSTIN, 8A-5P): [phone]  Office:  [phone]    SUBJECTIVE / OVERNIGHT EVENTS: No events overnight. + constipation. States he is feeling better every day but still with pain mostly in his ribs. No SOB. No fever/chills. Feels that swelling in his face is improved.  ADDITIONAL REVIEW OF SYSTEMS:    MEDICATIONS  (STANDING):  artificial tears (preservative free) Ophthalmic Solution 1 Drop(s) Left EYE four times a day  chlorhexidine 2% Cloths 1 Application(s) Topical <User Schedule>  lidocaine   4% Patch 1 Patch Transdermal every 24 hours  lidocaine   4% Patch 1 Patch Transdermal every 24 hours  polyethylene glycol 3350 17 Gram(s) Oral every 12 hours  rivaroxaban 10 milliGRAM(s) Oral two times a day  senna 2 Tablet(s) Oral at bedtime    MEDICATIONS  (PRN):  acetaminophen     Tablet .. 975 milliGRAM(s) Oral every 6 hours PRN Mild Pain (1 - 3)  oxyCODONE    IR 5 milliGRAM(s) Oral every 4 hours PRN Moderate Pain (4 - 6)  oxyCODONE    IR 10 milliGRAM(s) Oral every 4 hours PRN Severe Pain (7 - 10)      I&O's Summary    31 Mar 2022 07:01  -  01 Apr 2022 07:00  --------------------------------------------------------  IN: 2839.2 mL / OUT: 3225 mL / NET: -385.8 mL    01 Apr 2022 07:01  -  01 Apr 2022 12:38  --------------------------------------------------------  IN: 422.5 mL / OUT: 250 mL / NET: 172.5 mL        PHYSICAL EXAM:  Vital Signs Last 24 Hrs  T(C): 36.4 (01 Apr 2022 11:00), Max: 37.1 (31 Mar 2022 19:00)  T(F): 97.5 (01 Apr 2022 11:00), Max: 98.8 (31 Mar 2022 19:00)  HR: 90 (01 Apr 2022 12:00) (90 - 118)  BP: 117/56 (01 Apr 2022 12:00) (103/55 - 135/68)  BP(mean): 81 (01 Apr 2022 12:00) (73 - 95)  RR: 12 (01 Apr 2022 12:00) (12 - 22)  SpO2: 95% (01 Apr 2022 12:00) (92% - 98%)  CONSTITUTIONAL: Well-groomed, in no apparent distress  EYES: L subconjunctival hemorrhage, non-icteric; PERRL and symmetrics. Periorbital swelling and ecchymoses noted with raccoon eyes, which is improved.  ENMT: Nasal splint in place. Diffuse swelling and erythema.  RESPIRATORY: Breathing comfortably; lungs CTA without wheeze/rhonchi/rales  CARDIOVASCULAR: +S1S2, RRR, no M/G/R; no carotid bruits; pedal pulses full and symmetric; no lower extremity edema  CHEST/BREAST: Tenderness to palpation anteriorly over rib cage. Left chest wall chest tube noted.  GASTROINTESTINAL: No palpable masses or tenderness, +BS throughout, no rebound/guarding; no hepatosplenomegaly; no hernia palpated  MUSCULOSKELETAL: No digital clubbing or cyanosis; no paraspinal tenderness; LLE and RLE are equal length. Foot plantar/dorsiflexors 5/5 b/l.  SKIN: Diffuse ecchymoses noted. Left hip wound bandaged, consistent with surgery.   NEUROLOGIC: CN II-XII intact; normal reflexes in upper extremities; sensation intact in LEs b/l to light touch  PSYCHIATRIC: A+O x 3; mood and affect appropriate; appropriate insight and judgment    LABS:                        8.4    6.78  )-----------( 172      ( 31 Mar 2022 23:36 )             24.9     03-31    138  |  102  |  12  ----------------------------<  123<H>  3.6   |  30  |  0.83    Ca    8.3<L>      31 Mar 2022 23:36  Phos  2.7     03-31  Mg     2.1     03-31      RADIOLOGY & ADDITIONAL TESTS:  Results Reviewed: Labs reviewed as above  Imaging Personally Reviewed: CXR 3/31: L sided pigtail catheter. Small b/l pleural effusions. + subcutaneous emphysema  Electrocardiogram Personally Reviewed: No new studies    COORDINATION OF CARE:  Care Discussed with Consultants/Other Providers [Y]: Surgery Team - Dr. Mullins - regarding constipation and pain management

## 2022-04-01 NOTE — PROGRESS NOTE ADULT - SUBJECTIVE AND OBJECTIVE BOX
Surgery Progress Note     Subjective/24hour Events: Patient seen and examined at the bedside this morning. No acute events overnight. Pain controlled.     24 HOUR EVENTS:  - Chest wall to suction for 4 hrs, s/p CXR stable   - Plan to pull Chest tube in AM   - D/C Dilaudid, increase oxy     Vital Signs:  Vital Signs Last 24 Hrs  T(C): 36.8 (01 Apr 2022 03:00), Max: 37.4 (31 Mar 2022 07:00)  T(F): 98.2 (01 Apr 2022 03:00), Max: 99.3 (31 Mar 2022 07:00)  HR: 100 (01 Apr 2022 03:00) (95 - 118)  BP: 122/58 (01 Apr 2022 03:00) (103/55 - 135/68)  BP(mean): 83 (01 Apr 2022 03:00) (73 - 95)  RR: 15 (01 Apr 2022 03:00) (12 - 25)  SpO2: 97% (01 Apr 2022 03:00) (93% - 99%)        I&O's Detail    30 Mar 2022 07:01  -  31 Mar 2022 07:00  --------------------------------------------------------  IN:    IV PiggyBack: 283.3 mL    Lactated Ringers: 500 mL    Oral Fluid: 1860 mL    Propofol: 31.3 mL  Total IN: 2674.7 mL    OUT:    Chest Tube (mL): 5 mL    Indwelling Catheter - Urethral (mL): 465 mL    Voided (mL): 1950 mL  Total OUT: 2420 mL    Total NET: 254.7 mL      31 Mar 2022 07:01  -  01 Apr 2022 03:27  --------------------------------------------------------  IN:    IV PiggyBack: 416.7 mL    Oral Fluid: 2235 mL  Total IN: 2651.7 mL    OUT:    Chest Tube (mL): 25 mL    Voided (mL): 2800 mL  Total OUT: 2825 mL    Total NET: -173.3 mL      Physical Exam  Gen: NAD  HEENT: lip lac repaired, nasal bridge w dressing in place   Respiratory: no increased work of breathing, L CT LCWS, no air leak   Abd:  soft, nontender, nondistended, LUQ ecchymosis  Extremities:  warm and well perfused, can not assess mobilization or sensitivity.     Labs:    03-31    138  |  102  |  12  ----------------------------<  123<H>  3.6   |  30  |  0.83    Ca    8.3<L>      31 Mar 2022 23:36  Phos  2.7     03-31  Mg     2.1     03-31      CAPILLARY BLOOD GLUCOSE                                8.4    6.78  )-----------( 172      ( 31 Mar 2022 23:36 )             24.9     PT/INR - ( 30 Mar 2022 04:33 )   PT: 13.4 sec;   INR: 1.15 ratio         PTT - ( 30 Mar 2022 04:33 )  PTT:25.2 sec       Surgery Progress Note     Subjective/24hour Events: Patient seen and examined at the bedside this morning. Pain controlled.     24 HOUR EVENTS:  - Chest wall to suction for 4 hrs, s/p CXR stable   - D/C Dilaudid, increase oxy     Vital Signs:  Vital Signs Last 24 Hrs  T(C): 36.8 (01 Apr 2022 03:00), Max: 37.4 (31 Mar 2022 07:00)  T(F): 98.2 (01 Apr 2022 03:00), Max: 99.3 (31 Mar 2022 07:00)  HR: 100 (01 Apr 2022 03:00) (95 - 118)  BP: 122/58 (01 Apr 2022 03:00) (103/55 - 135/68)  BP(mean): 83 (01 Apr 2022 03:00) (73 - 95)  RR: 15 (01 Apr 2022 03:00) (12 - 25)  SpO2: 97% (01 Apr 2022 03:00) (93% - 99%)        I&O's Detail    30 Mar 2022 07:01  -  31 Mar 2022 07:00  --------------------------------------------------------  IN:    IV PiggyBack: 283.3 mL    Lactated Ringers: 500 mL    Oral Fluid: 1860 mL    Propofol: 31.3 mL  Total IN: 2674.7 mL    OUT:    Chest Tube (mL): 5 mL    Indwelling Catheter - Urethral (mL): 465 mL    Voided (mL): 1950 mL  Total OUT: 2420 mL    Total NET: 254.7 mL      31 Mar 2022 07:01  -  01 Apr 2022 03:27  --------------------------------------------------------  IN:    IV PiggyBack: 416.7 mL    Oral Fluid: 2235 mL  Total IN: 2651.7 mL    OUT:    Chest Tube (mL): 25 mL    Voided (mL): 2800 mL  Total OUT: 2825 mL    Total NET: -173.3 mL      Physical Exam  Gen: NAD  HEENT: lip lac repaired, nasal bridge w dressing in place   Respiratory: no increased work of breathing, L CT LCWS, no air leak   Abd:  soft, nontender, nondistended, LUQ ecchymosis  Extremities:  warm and well perfused, can not assess mobilization or sensitivity.     Labs:    03-31    138  |  102  |  12  ----------------------------<  123<H>  3.6   |  30  |  0.83    Ca    8.3<L>      31 Mar 2022 23:36  Phos  2.7     03-31  Mg     2.1     03-31      CAPILLARY BLOOD GLUCOSE                                8.4    6.78  )-----------( 172      ( 31 Mar 2022 23:36 )             24.9     PT/INR - ( 30 Mar 2022 04:33 )   PT: 13.4 sec;   INR: 1.15 ratio         PTT - ( 30 Mar 2022 04:33 )  PTT:25.2 sec
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
## 2022-04-01 NOTE — PROVIDER CONTACT NOTE (OTHER) - ASSESSMENT
Patient is A&Ox4. Patient is on 1L NC. Patient denies pain and or trouble breathing. Patients heart rate was as high as 127. Patients blood pressure is 135/63. Lungs sounds are present and equal bilaterally. No bubbles noted in chest tube chamber. Patient is A&Ox4. Patient is on 1L NC. Patient denies pain and or trouble breathing. Patients heart rate was as high as 127 but is currently 117. Patients blood pressure is 135/63. Lungs sounds are present and equal bilaterally. No bubbles noted in chest tube chamber.

## 2022-04-02 LAB
ANION GAP SERPL CALC-SCNC: 13 MMOL/L — SIGNIFICANT CHANGE UP (ref 5–17)
BUN SERPL-MCNC: 13 MG/DL — SIGNIFICANT CHANGE UP (ref 7–23)
CALCIUM SERPL-MCNC: 8.9 MG/DL — SIGNIFICANT CHANGE UP (ref 8.4–10.5)
CHLORIDE SERPL-SCNC: 101 MMOL/L — SIGNIFICANT CHANGE UP (ref 96–108)
CO2 SERPL-SCNC: 23 MMOL/L — SIGNIFICANT CHANGE UP (ref 22–31)
CREAT SERPL-MCNC: 0.9 MG/DL — SIGNIFICANT CHANGE UP (ref 0.5–1.3)
EGFR: 100 ML/MIN/1.73M2 — SIGNIFICANT CHANGE UP
GLUCOSE SERPL-MCNC: 146 MG/DL — HIGH (ref 70–99)
HCT VFR BLD CALC: 31.5 % — LOW (ref 39–50)
HGB BLD-MCNC: 10.4 G/DL — LOW (ref 13–17)
MAGNESIUM SERPL-MCNC: 2.2 MG/DL — SIGNIFICANT CHANGE UP (ref 1.6–2.6)
MCHC RBC-ENTMCNC: 30.9 PG — SIGNIFICANT CHANGE UP (ref 27–34)
MCHC RBC-ENTMCNC: 33 GM/DL — SIGNIFICANT CHANGE UP (ref 32–36)
MCV RBC AUTO: 93.5 FL — SIGNIFICANT CHANGE UP (ref 80–100)
NRBC # BLD: 0 /100 WBCS — SIGNIFICANT CHANGE UP (ref 0–0)
PHOSPHATE SERPL-MCNC: 3.5 MG/DL — SIGNIFICANT CHANGE UP (ref 2.5–4.5)
PLATELET # BLD AUTO: 319 K/UL — SIGNIFICANT CHANGE UP (ref 150–400)
POTASSIUM SERPL-MCNC: 4.3 MMOL/L — SIGNIFICANT CHANGE UP (ref 3.5–5.3)
POTASSIUM SERPL-SCNC: 4.3 MMOL/L — SIGNIFICANT CHANGE UP (ref 3.5–5.3)
RBC # BLD: 3.37 M/UL — LOW (ref 4.2–5.8)
RBC # FLD: 13.4 % — SIGNIFICANT CHANGE UP (ref 10.3–14.5)
SODIUM SERPL-SCNC: 137 MMOL/L — SIGNIFICANT CHANGE UP (ref 135–145)
WBC # BLD: 6.91 K/UL — SIGNIFICANT CHANGE UP (ref 3.8–10.5)
WBC # FLD AUTO: 6.91 K/UL — SIGNIFICANT CHANGE UP (ref 3.8–10.5)

## 2022-04-02 PROCEDURE — 99233 SBSQ HOSP IP/OBS HIGH 50: CPT

## 2022-04-02 PROCEDURE — 71045 X-RAY EXAM CHEST 1 VIEW: CPT | Mod: 26

## 2022-04-02 RX ADMIN — Medication 1 DROP(S): at 06:13

## 2022-04-02 RX ADMIN — LIDOCAINE 1 PATCH: 4 CREAM TOPICAL at 06:12

## 2022-04-02 RX ADMIN — LIDOCAINE 1 PATCH: 4 CREAM TOPICAL at 17:16

## 2022-04-02 RX ADMIN — OXYCODONE HYDROCHLORIDE 5 MILLIGRAM(S): 5 TABLET ORAL at 12:08

## 2022-04-02 RX ADMIN — OXYCODONE HYDROCHLORIDE 5 MILLIGRAM(S): 5 TABLET ORAL at 07:02

## 2022-04-02 RX ADMIN — OXYCODONE HYDROCHLORIDE 5 MILLIGRAM(S): 5 TABLET ORAL at 00:57

## 2022-04-02 RX ADMIN — OXYCODONE HYDROCHLORIDE 5 MILLIGRAM(S): 5 TABLET ORAL at 23:28

## 2022-04-02 RX ADMIN — Medication 975 MILLIGRAM(S): at 17:20

## 2022-04-02 RX ADMIN — RIVAROXABAN 10 MILLIGRAM(S): KIT at 06:13

## 2022-04-02 RX ADMIN — RIVAROXABAN 10 MILLIGRAM(S): KIT at 17:10

## 2022-04-02 RX ADMIN — Medication 1 DROP(S): at 00:27

## 2022-04-02 RX ADMIN — OXYCODONE HYDROCHLORIDE 5 MILLIGRAM(S): 5 TABLET ORAL at 00:27

## 2022-04-02 RX ADMIN — Medication 975 MILLIGRAM(S): at 23:28

## 2022-04-02 RX ADMIN — CHLORHEXIDINE GLUCONATE 1 APPLICATION(S): 213 SOLUTION TOPICAL at 12:12

## 2022-04-02 RX ADMIN — Medication 975 MILLIGRAM(S): at 17:11

## 2022-04-02 RX ADMIN — Medication 1 DROP(S): at 12:08

## 2022-04-02 RX ADMIN — OXYCODONE HYDROCHLORIDE 5 MILLIGRAM(S): 5 TABLET ORAL at 07:32

## 2022-04-02 RX ADMIN — Medication 975 MILLIGRAM(S): at 23:58

## 2022-04-02 RX ADMIN — OXYCODONE HYDROCHLORIDE 5 MILLIGRAM(S): 5 TABLET ORAL at 17:20

## 2022-04-02 RX ADMIN — Medication 1 DROP(S): at 23:29

## 2022-04-02 RX ADMIN — OXYCODONE HYDROCHLORIDE 5 MILLIGRAM(S): 5 TABLET ORAL at 17:09

## 2022-04-02 RX ADMIN — Medication 1 DROP(S): at 17:15

## 2022-04-02 RX ADMIN — POLYETHYLENE GLYCOL 3350 17 GRAM(S): 17 POWDER, FOR SOLUTION ORAL at 10:09

## 2022-04-02 RX ADMIN — OXYCODONE HYDROCHLORIDE 5 MILLIGRAM(S): 5 TABLET ORAL at 23:58

## 2022-04-02 RX ADMIN — OXYCODONE HYDROCHLORIDE 5 MILLIGRAM(S): 5 TABLET ORAL at 12:25

## 2022-04-02 RX ADMIN — Medication 975 MILLIGRAM(S): at 12:08

## 2022-04-02 RX ADMIN — Medication 975 MILLIGRAM(S): at 12:25

## 2022-04-02 NOTE — PROGRESS NOTE ADULT - SUBJECTIVE AND OBJECTIVE BOX
24h Events:  No acute events overnight.    Subjective:   Patient seen at bedside this AM.     Objective:  Vital Signs  T(C): 36.8 (04-02 @ 06:28), Max: 37.1 (04-01 @ 19:00)  HR: 72 (04-02 @ 06:28) (72 - 105)  BP: 123/76 (04-02 @ 06:28) (116/56 - 143/67)  RR: 18 (04-02 @ 06:28) (12 - 23)  SpO2: 97% (04-02 @ 06:28) (93% - 98%)  04-01-22 @ 07:01  -  04-02-22 @ 07:00  --------------------------------------------------------  IN:  Total IN: 0 mL    OUT:    Voided (mL): 3650 mL  Total OUT: 3650 mL    Total NET: -3650 mL        Physical Exam  Gen: NAD  HEENT: lip lac repaired, nasal bridge w dressing in place   Respiratory: no increased work of breathing, L CT LCWS, no air leak   Abd:  soft, nontender, nondistended, LUQ ecchymosis  Extremities:  warm and well perfused, can not assess mobilization or sensitivity.    Labs:                        8.4    6.78  )-----------( 172      ( 31 Mar 2022 23:36 )             24.9   03-31    138  |  102  |  12  ----------------------------<  123<H>  3.6   |  30  |  0.83    Ca    8.3<L>      31 Mar 2022 23:36  Phos  2.7     03-31  Mg     2.1     03-31      CAPILLARY BLOOD GLUCOSE          Imaging:
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
## 2022-04-02 NOTE — PROGRESS NOTE ADULT - ATTENDING COMMENTS
seen and examined 04-02-22 @ 1155    tolerating regular diet w/o nausea or vomiting  no BM since admission    bilateral periorbital ecchymosis  left lateral subconjunctival hemorrhage  EOMI  splint over nose  moderate left lateral chest wall tenderness with ecchymosis and no crepitus  right anterior chest nontender with ecchymosis and no crepitus  soft / NT / ND  dry dressing over left posterior hip    left 6-9 lateral displaced rib fractures  right 2-4 anterior minimally displaced rib fractures  -pain control    traumatic left hemothorax  -s/p pigtail 3/30 - 4/1  -no recurrent pneumothorax on 4/2 CXR    3/29/2022 - ORIF left posterior wall acetabular fracture and removal of femoral head fracture fracgment  -LLE toe-touch weight bearin  -hip abduction pillow while in bed  -Xarelto x 35 days    constipation  -continue Senna  -start Miralax    dispo  -home PT    plan discussed with patient's son at bedside

## 2022-04-03 LAB
ANION GAP SERPL CALC-SCNC: 10 MMOL/L — SIGNIFICANT CHANGE UP (ref 5–17)
BUN SERPL-MCNC: 12 MG/DL — SIGNIFICANT CHANGE UP (ref 7–23)
CALCIUM SERPL-MCNC: 8.8 MG/DL — SIGNIFICANT CHANGE UP (ref 8.4–10.5)
CHLORIDE SERPL-SCNC: 103 MMOL/L — SIGNIFICANT CHANGE UP (ref 96–108)
CO2 SERPL-SCNC: 25 MMOL/L — SIGNIFICANT CHANGE UP (ref 22–31)
CREAT SERPL-MCNC: 0.83 MG/DL — SIGNIFICANT CHANGE UP (ref 0.5–1.3)
EGFR: 102 ML/MIN/1.73M2 — SIGNIFICANT CHANGE UP
GLUCOSE SERPL-MCNC: 126 MG/DL — HIGH (ref 70–99)
HCT VFR BLD CALC: 28.4 % — LOW (ref 39–50)
HGB BLD-MCNC: 9.5 G/DL — LOW (ref 13–17)
MAGNESIUM SERPL-MCNC: 2.3 MG/DL — SIGNIFICANT CHANGE UP (ref 1.6–2.6)
MCHC RBC-ENTMCNC: 30.7 PG — SIGNIFICANT CHANGE UP (ref 27–34)
MCHC RBC-ENTMCNC: 33.5 GM/DL — SIGNIFICANT CHANGE UP (ref 32–36)
MCV RBC AUTO: 91.9 FL — SIGNIFICANT CHANGE UP (ref 80–100)
NRBC # BLD: 0 /100 WBCS — SIGNIFICANT CHANGE UP (ref 0–0)
PHOSPHATE SERPL-MCNC: 3.2 MG/DL — SIGNIFICANT CHANGE UP (ref 2.5–4.5)
PLATELET # BLD AUTO: 297 K/UL — SIGNIFICANT CHANGE UP (ref 150–400)
POTASSIUM SERPL-MCNC: 3.9 MMOL/L — SIGNIFICANT CHANGE UP (ref 3.5–5.3)
POTASSIUM SERPL-SCNC: 3.9 MMOL/L — SIGNIFICANT CHANGE UP (ref 3.5–5.3)
RBC # BLD: 3.09 M/UL — LOW (ref 4.2–5.8)
RBC # FLD: 13.6 % — SIGNIFICANT CHANGE UP (ref 10.3–14.5)
SODIUM SERPL-SCNC: 138 MMOL/L — SIGNIFICANT CHANGE UP (ref 135–145)
WBC # BLD: 6.59 K/UL — SIGNIFICANT CHANGE UP (ref 3.8–10.5)
WBC # FLD AUTO: 6.59 K/UL — SIGNIFICANT CHANGE UP (ref 3.8–10.5)

## 2022-04-03 PROCEDURE — 99232 SBSQ HOSP IP/OBS MODERATE 35: CPT

## 2022-04-03 RX ADMIN — CHLORHEXIDINE GLUCONATE 1 APPLICATION(S): 213 SOLUTION TOPICAL at 09:00

## 2022-04-03 RX ADMIN — LIDOCAINE 1 PATCH: 4 CREAM TOPICAL at 05:09

## 2022-04-03 RX ADMIN — Medication 1 DROP(S): at 05:07

## 2022-04-03 RX ADMIN — RIVAROXABAN 10 MILLIGRAM(S): KIT at 05:09

## 2022-04-03 RX ADMIN — LIDOCAINE 1 PATCH: 4 CREAM TOPICAL at 16:36

## 2022-04-03 RX ADMIN — LIDOCAINE 1 PATCH: 4 CREAM TOPICAL at 05:08

## 2022-04-03 RX ADMIN — OXYCODONE HYDROCHLORIDE 5 MILLIGRAM(S): 5 TABLET ORAL at 11:42

## 2022-04-03 RX ADMIN — Medication 1 DROP(S): at 17:34

## 2022-04-03 RX ADMIN — Medication 1 DROP(S): at 11:35

## 2022-04-03 RX ADMIN — OXYCODONE HYDROCHLORIDE 5 MILLIGRAM(S): 5 TABLET ORAL at 08:00

## 2022-04-03 RX ADMIN — OXYCODONE HYDROCHLORIDE 5 MILLIGRAM(S): 5 TABLET ORAL at 06:59

## 2022-04-03 RX ADMIN — LIDOCAINE 1 PATCH: 4 CREAM TOPICAL at 07:30

## 2022-04-03 RX ADMIN — Medication 975 MILLIGRAM(S): at 11:42

## 2022-04-03 RX ADMIN — Medication 975 MILLIGRAM(S): at 18:33

## 2022-04-03 RX ADMIN — Medication 1 DROP(S): at 23:09

## 2022-04-03 RX ADMIN — OXYCODONE HYDROCHLORIDE 5 MILLIGRAM(S): 5 TABLET ORAL at 12:42

## 2022-04-03 RX ADMIN — RIVAROXABAN 10 MILLIGRAM(S): KIT at 17:34

## 2022-04-03 RX ADMIN — Medication 975 MILLIGRAM(S): at 12:42

## 2022-04-03 RX ADMIN — Medication 975 MILLIGRAM(S): at 17:33
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
## 2022-04-03 NOTE — PROGRESS NOTE ADULT - SUBJECTIVE AND OBJECTIVE BOX
24h Events:  No acute events overnight.    Subjective:   Patient seen at bedside this AM.     Objective:  Vital Signs Last 24 Hrs  T(C): 36.9 (03 Apr 2022 01:12), Max: 37.2 (02 Apr 2022 09:44)  T(F): 98.5 (03 Apr 2022 01:12), Max: 99 (02 Apr 2022 09:44)  HR: 92 (03 Apr 2022 01:12) (72 - 107)  BP: 134/74 (03 Apr 2022 01:12) (123/76 - 147/90)  BP(mean): --  RR: 18 (03 Apr 2022 01:12) (18 - 18)  SpO2: 97% (03 Apr 2022 01:12) (95% - 98%)    I&O's Detail    01 Apr 2022 07:01  -  02 Apr 2022 07:00  --------------------------------------------------------  IN:    IV PiggyBack: 62.5 mL    Oral Fluid: 2145 mL  Total IN: 2207.5 mL    OUT:    Chest Tube (mL): 10 mL    Voided (mL): 3650 mL  Total OUT: 3660 mL    Total NET: -1452.5 mL      02 Apr 2022 07:01  -  03 Apr 2022 01:44  --------------------------------------------------------  IN:    Oral Fluid: 240 mL  Total IN: 240 mL    OUT:    Voided (mL): 826 mL  Total OUT: 826 mL    Total NET: -586 mL    Physical Exam  Gen: NAD  HEENT: lip lac repaired, nasal bridge w dressing in place   Respiratory: no increased work of breathing  Abd:  soft, nontender, nondistended, LUQ ecchymosis  Extremities:  warm and well perfused, can not assess mobilization or sensitivity.    Labs:                                   10.4   6.91  )-----------( 319      ( 02 Apr 2022 14:00 )             31.5   04-02    137  |  101  |  13  ----------------------------<  146<H>  4.3   |  23  |  0.90    Ca    8.9      02 Apr 2022 14:00  Phos  3.5     04-02  Mg     2.2     04-02

## 2022-04-03 NOTE — PROGRESS NOTE ADULT - ATTENDING COMMENTS
Patient seen and examined  Doing well  No new complaints  Likely d/c to home tomorrow if continues to do well  Discussed need for prolonged DVT prophylaxis with DOAC  Discussed need for family to bring in No [employer] Insurance information

## 2022-04-04 VITALS
OXYGEN SATURATION: 100 % | TEMPERATURE: 98 F | SYSTOLIC BLOOD PRESSURE: 134 MMHG | HEART RATE: 100 BPM | DIASTOLIC BLOOD PRESSURE: 82 MMHG | RESPIRATION RATE: 18 BRPM

## 2022-04-04 LAB — SARS-COV-2 RNA SPEC QL NAA+PROBE: SIGNIFICANT CHANGE UP

## 2022-04-04 PROCEDURE — 99233 SBSQ HOSP IP/OBS HIGH 50: CPT

## 2022-04-04 RX ORDER — POLYETHYLENE GLYCOL 3350 17 G/17G
17 POWDER, FOR SOLUTION ORAL
Qty: 0 | Refills: 0 | DISCHARGE
Start: 2022-04-04

## 2022-04-04 RX ORDER — RIVAROXABAN 15 MG-20MG
1 KIT ORAL
Qty: 29 | Refills: 0
Start: 2022-04-04 | End: 2022-05-02

## 2022-04-04 RX ORDER — OXYCODONE HYDROCHLORIDE 5 MG/1
1 TABLET ORAL
Qty: 10 | Refills: 0
Start: 2022-04-04

## 2022-04-04 RX ORDER — ACETAMINOPHEN 500 MG
3 TABLET ORAL
Qty: 0 | Refills: 0 | DISCHARGE
Start: 2022-04-04

## 2022-04-04 RX ORDER — RIVAROXABAN 15 MG-20MG
1 KIT ORAL
Qty: 58 | Refills: 0
Start: 2022-04-04 | End: 2022-05-02

## 2022-04-04 RX ORDER — SENNA PLUS 8.6 MG/1
2 TABLET ORAL
Qty: 0 | Refills: 0 | DISCHARGE
Start: 2022-04-04

## 2022-04-04 RX ADMIN — Medication 975 MILLIGRAM(S): at 05:50

## 2022-04-04 RX ADMIN — Medication 1 DROP(S): at 05:04

## 2022-04-04 RX ADMIN — RIVAROXABAN 10 MILLIGRAM(S): KIT at 05:04

## 2022-04-04 RX ADMIN — LIDOCAINE 1 PATCH: 4 CREAM TOPICAL at 05:06

## 2022-04-04 RX ADMIN — LIDOCAINE 1 PATCH: 4 CREAM TOPICAL at 05:05

## 2022-04-04 RX ADMIN — Medication 975 MILLIGRAM(S): at 05:20

## 2022-04-04 NOTE — PROGRESS NOTE ADULT - SUBJECTIVE AND OBJECTIVE BOX
ACS DAILY PROGRESS NOTE:       SUBJECTIVE/ROS: NO acute events overnight. Pain controlled. tolerating regular diet. denies f/c/n/v      MEDICATIONS  (STANDING):  artificial tears (preservative free) Ophthalmic Solution 1 Drop(s) Left EYE four times a day  lidocaine   4% Patch 1 Patch Transdermal every 24 hours  lidocaine   4% Patch 1 Patch Transdermal every 24 hours  polyethylene glycol 3350 17 Gram(s) Oral every 12 hours  rivaroxaban 10 milliGRAM(s) Oral two times a day  senna 2 Tablet(s) Oral at bedtime    MEDICATIONS  (PRN):  acetaminophen     Tablet .. 975 milliGRAM(s) Oral every 6 hours PRN Mild Pain (1 - 3)  oxyCODONE    IR 5 milliGRAM(s) Oral every 4 hours PRN Moderate Pain (4 - 6)  oxyCODONE    IR 10 milliGRAM(s) Oral every 4 hours PRN Severe Pain (7 - 10)      OBJECTIVE:    Vital Signs Last 24 Hrs  T(C): 36.8 (04 Apr 2022 01:08), Max: 37.3 (03 Apr 2022 17:26)  T(F): 98.2 (04 Apr 2022 01:08), Max: 99.1 (03 Apr 2022 17:26)  HR: 89 (04 Apr 2022 01:08) (76 - 99)  BP: 124/73 (04 Apr 2022 01:08) (124/73 - 144/80)  BP(mean): --  RR: 18 (04 Apr 2022 01:08) (18 - 18)  SpO2: 98% (04 Apr 2022 01:08) (95% - 100%)        I&O's Detail    02 Apr 2022 07:01  -  03 Apr 2022 07:00  --------------------------------------------------------  IN:    Oral Fluid: 240 mL  Total IN: 240 mL    OUT:    Voided (mL): 1126 mL  Total OUT: 1126 mL    Total NET: -886 mL      03 Apr 2022 07:01  -  04 Apr 2022 01:22  --------------------------------------------------------  IN:    Oral Fluid: 480 mL  Total IN: 480 mL    OUT:    Voided (mL): 1400 mL  Total OUT: 1400 mL    Total NET: -920 mL          Daily     Daily     LABS:                        9.5    6.59  )-----------( 297      ( 03 Apr 2022 07:25 )             28.4     04-03    138  |  103  |  12  ----------------------------<  126<H>  3.9   |  25  |  0.83    Ca    8.8      03 Apr 2022 07:24  Phos  3.2     04-03  Mg     2.3     04-03                Physical Exam  Gen: NAD  HEENT: lip lac repaired, nasal bridge w dressing in place   Respiratory: no increased work of breathing  Abd:  soft, nontender, nondistended, LUQ ecchymosis  Extremities:  warm and well perfused, can not assess mobilization or sensitivity.

## 2022-04-04 NOTE — PROGRESS NOTE ADULT - PROBLEM SELECTOR PLAN 4
HbA1c of 5.4% is not consistent with a new diagnosis of diabetes mellitus. Hyperglycemia likely driven by stress.  - Continue FS monitoring and admelog sliding scale coverage. If FS normalize, discontinue monitoring.  - Would repeat A1c testing in 3-6 months, discussed with patient
HbA1c of 5.4% is not consistent with a new diagnosis of diabetes mellitus. Hyperglycemia likely driven by stress.  - Continue FS monitoring and admelog sliding scale coverage. If FS normalize, discontinue monitoring.  - Would repeat A1c testing in 3-6 months
HbA1c of 5.4% is not consistent with a new diagnosis of diabetes mellitus. Hyperglycemia likely driven by stress.  - Continue FS monitoring and admelog sliding scale coverage. If FS normalize, discontinue monitoring.  - Would repeat A1c testing in 3-6 months, discussed with patient
HbA1c of 5.4% is not consistent with a new diagnosis of diabetes mellitus. Hyperglycemia likely driven by stress.  - Continue FS monitoring and admelog sliding scale coverage. If FS normalize, discontinue monitoring.  - Would repeat A1c testing in 3-6 months
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
## 2022-04-04 NOTE — DISCHARGE NOTE PROVIDER - NSRESEARCHGRANT_MLMHIDDEN_GEN_A_CORE
Lissy Kumar was admitted to [facility] from ED via cart.  Reason for hospitalization is severe sepsis, altered mental status, acute cystitis, and pneumonia.   Upon arrival, patient is stable. Patient has history significant for hypertension, chronic kidney disease, osteoarthritis, and GERD.  Patient oriented to bed, call light,  and room.  Patient provided with the following educational materials upon admission:safety, advanced directives, infection control and pain.   Level of understanding patient verbalized understanding.   Admission orders received at this time.   MD, RN, CNA notified of patient arrival.   See Epic documentation for patient individualized nursing care plan.   yes

## 2022-04-04 NOTE — PROGRESS NOTE ADULT - PROBLEM SELECTOR PLAN 5
Incidental, small size. Normotensive with normal electrolytes otherwise.  - Finding communicated to patient and wife on 3/29.  - Should undergo functional testing post-hospitalization, and repeat imaging to ensure size stability.  - No inpatient work-up planned at this time.
Incidental, small size. Normotensive with normal electrolytes otherwise.  - Finding communicated by prior hospitalist to patient and wife on 3/29.  - Should undergo functional testing post-hospitalization, and repeat imaging to ensure size stability.  - No inpatient work-up planned at this time.
Incidental, small size. Normotensive with normal electrolytes otherwise.  - Finding communicated to patient and wife on 3/29.  - Should undergo functional testing post-hospitalization, and repeat imaging to ensure size stability.  - No inpatient work-up planned at this time.
Incidental, small size. Normotensive with normal electrolytes otherwise.  - Finding communicated to patient and wife on 3/29.  - Should undergo functional testing post-hospitalization, and repeat imaging to ensure size stability.  - No inpatient work-up planned at this time.

## 2022-04-04 NOTE — DISCHARGE NOTE PROVIDER - NSDCMRMEDTOKEN_GEN_ALL_CORE_FT
3-in-1 commode: 1 each percutaneous once a day   Rolling walker1: 1 each percutaneous once a day    3-in-1 commode: 1 each percutaneous once a day   acetaminophen 325 mg oral tablet: 3 tab(s) orally every 6 hours, As needed, Mild Pain (1 - 3)  ocular lubricant ophthalmic solution: 1 drop(s) to each affected eye 4 times a day  oxyCODONE 5 mg oral tablet: 1 tab(s) orally every 6 hours, As Needed - for sever pain MDD:4  polyethylene glycol 3350 oral powder for reconstitution: 17 gram(s) orally every 12 hours  rivaroxaban 10 mg oral tablet: 1 tab(s) orally 2 times a day  Rolling walker1: 1 each percutaneous once a day   senna oral tablet: 2 tab(s) orally once a day (at bedtime)   3-in-1 commode: 1 each percutaneous once a day   acetaminophen 325 mg oral tablet: 3 tab(s) orally every 6 hours, As needed, Mild Pain (1 - 3)  home occupational therapy :   Home physical therapy :   ocular lubricant ophthalmic solution: 1 drop(s) to each affected eye 4 times a day  oxyCODONE 5 mg oral tablet: 1 tab(s) orally every 6 hours, As Needed - for sever pain MDD:4  polyethylene glycol 3350 oral powder for reconstitution: 17 gram(s) orally every 12 hours  rivaroxaban 10 mg oral tablet: 1 tab(s) orally 2 times a day  Rolling walker1: 1 each percutaneous once a day   senna oral tablet: 2 tab(s) orally once a day (at bedtime)

## 2022-04-04 NOTE — DISCHARGE NOTE NURSING/CASE MANAGEMENT/SOCIAL WORK - PATIENT PORTAL LINK FT
You can access the FollowMyHealth Patient Portal offered by [facility] by registering at the following website: [email or web address]. By joining [employer]’s FollowMyHealth portal, you will also be able to view your health information using other applications (apps) compatible with our system.

## 2022-04-04 NOTE — DISCHARGE NOTE NURSING/CASE MANAGEMENT/SOCIAL WORK - NSDCPEFALRISK_GEN_ALL_CORE
Home Oxygen Evaluation    Patient seen and evaluated for home oxygen needs per physician order.  A summary of the evaluation is listed below.       09/12/21 1117 09/12/21 1127 09/12/21 1138   Exercise (Home) O2 Eval   Liter Flow 0  (forehead sensor used) 0 0   Heart rate at rest 77 beats/min  --   --    SpO2 at rest 93 %  --   --    Heart rate during activity  --  101 beats/min  --    SpO2 during activity  --  89 %  --    Heart rate after activity  --   --  83 beats/min   SpO2 after activity  --   --  93 %   Distance (feet)  --   --  120 ft   Tolerance  --   --  well   $ Exercise O2 procedure complete (Pulmonary Stress Test) ([place] Only)  --   --  Procedure Complete       Recommendations are as follows: Patient does not qualify for home oxygen at this time.  Forehead sensor used for accuracy.  RN aware.   For information on Fall & Injury Prevention, visit: [email or web address] OR  [email or web address] OR  https://www.cdc.gov/steadi/patient.html

## 2022-04-04 NOTE — DISCHARGE NOTE NURSING/CASE MANAGEMENT/SOCIAL WORK - NSDCCRTYPESERV_GEN_ALL_CORE_FT
Physical and occupational therapy at home. [facility] will contact you to arrange visit.
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
## 2022-04-04 NOTE — PROGRESS NOTE ADULT - PROBLEM SELECTOR PLAN 1
- s/p left hip ORIF on 3/30  - TTWB left leg as tolerated  - VTE PPx per orthopedic surgery  - Pain control as ordered with bowel regimen.  - PT/OT evaluations  - Orthopedics f/u
- s/p left hip ORIF on 3/30  - TTWB left leg as tolerated  - VTE PPx per orthopedic surgery  - Pain control as ordered with aggressive bowel regimen. Consider addition of dulcolax suppository if no BM.  - PT/OT evaluations  - Orthopedics f/u
- s/p left hip ORIF on 3/30  - TTWB left leg as tolerated  - VTE PPx per orthopedic surgery  - Pain control as ordered with aggressive bowel regimen.   - PT/OT evaluations- home  - Orthopedics f/u
- s/p left hip ORIF on 3/30  - TTWB left leg as tolerated  - VTE PPx per orthopedic surgery  - Pain control as ordered with aggressive bowel regimen. Consider addition of dulcolax suppository if no BM.  - PT/OT evaluations  - Orthopedics f/u

## 2022-04-04 NOTE — DISCHARGE NOTE PROVIDER - PROVIDER TOKENS
PROVIDER:[TOKEN:[[id number]],FOLLOWUP:[1 week]],PROVIDER:[TOKEN:[[id number]],FOLLOWUP:[2 weeks]] PROVIDER:[TOKEN:[[id number]],FOLLOWUP:[1 week]],PROVIDER:[TOKEN:[[id number]],FOLLOWUP:[2 weeks]]

## 2022-04-04 NOTE — DISCHARGE NOTE PROVIDER - CARE PROVIDER_API CALL
Antonio Mckoy (DO)  Plastic Surgery  [address] [place] [address]  Phone: [phone]  Fax: [phone]  Follow Up Time: 1 week    Maximo Shook)  Ophthalmology  [address] [place] [address]  Phone: [phone]  Fax: [phone]  Follow Up Time: 2 weeks   Antonio Mckoy (DO)  Plastic Surgery  [address] [place] [address]  Phone: [phone]  Fax: [phone]  Follow Up Time: 1 week    Cheo Sohok)  Orthopaedic Surgery  [address] [place] [address], [address]  [place] [address]  Phone: [phone]  Fax: [phone]  Follow Up Time: 2 weeks

## 2022-04-04 NOTE — DISCHARGE NOTE PROVIDER - NSDCCPCAREPLAN_GEN_ALL_CORE_FT
PRINCIPAL DISCHARGE DIAGNOSIS  Diagnosis: Hip dislocation, left  Assessment and Plan of Treatment: S/p Left acetabullar ORIF.   Please follow up with orthopedic surgery as outpatient.   Please continue to work with physical therapy at home.   BATHING: Please do not submerge wound underwater. You may shower and/or sponge bathe.  ACTIVITY: No heavy lifting anything more than 10-15lbs or straining. Otherwise, you may return to your usual level of physical activity. If you are taking narcotic pain medication (such as Percocet), do NOT drive a car, operate machinery or make important decisions.  DIET: regular diet  NOTIFY YOUR SURGEON IF: You have any bleeding that does not stop, any pus draining from your wound, any fever (over 100.4 F) or chills, persistent nausea/vomiting with inability to tolerate food or liquids, persistent diarrhea, or if your pain is not controlled on your discharge pain medications.  FOLLOW-UP:  1. Please call to make a follow-up appointment within one week of discharge with orthopedics.        SECONDARY DISCHARGE DIAGNOSES  Diagnosis: Facial fracture  Assessment and Plan of Treatment: You facial laceration was repaired by plastics.  Please follow up outpatient with plastic surgery after discharge.    Diagnosis: Multiple fractures of ribs of left side  Assessment and Plan of Treatment: Rib fractures take time to heal.  Please continue to take pain medications as needded.   Please continue to use your incentive spirometer and take deep breaths.    Diagnosis: Pneumothorax  Assessment and Plan of Treatment: You had a pneumothorax that required placement of a chest tube.    If you develop shortness of breath, or chest pain please return to the hospital.    Diagnosis: Closed fracture of lumbar vertebra without spinal cord injury  Assessment and Plan of Treatment:

## 2022-04-04 NOTE — PROGRESS NOTE ADULT - PROVIDER SPECIALTY LIST ADULT
SICU
Trauma Surgery
Orthopedics
Orthopedics
Plastic Surgery
Trauma Surgery
Orthopedics
Orthopedics
Trauma Surgery
Internal Medicine
SICU
Trauma Surgery
Trauma Surgery
Internal Medicine
Hospitalist
Internal Medicine
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
## 2022-04-04 NOTE — PROGRESS NOTE ADULT - ATTENDING COMMENTS
ATTENDING ATTESTATION  I have seen and examined this patient on rounds thismorning with the surgery team. I have reviewed all new labs, imaging and reports. I have participated in formulating the plan for the day, and have read and agree with the history, ROS, exam, assessment and plan as stated above.     Planned for discharge home with home PT today.     Sapna Mullins M.D., M.S.  Division of Acute Care Surgery

## 2022-04-04 NOTE — DISCHARGE NOTE NURSING/CASE MANAGEMENT/SOCIAL WORK - NSDCVIVACCINE_GEN_ALL_CORE_FT
Tdap; 28-Mar-2022 20:36; Rosana Barton (RN); [employer]; [id number] (Exp. Date: 09-Sep-2023); IntraMuscular; Deltoid Right.; 0.5 milliLiter(s); VIS (VIS Published: 09-May-2013, VIS Presented: 28-Mar-2022);

## 2022-04-06 LAB — SURGICAL PATHOLOGY STUDY: SIGNIFICANT CHANGE UP

## 2022-05-30 PROCEDURE — 83690 ASSAY OF LIPASE: CPT

## 2022-05-30 PROCEDURE — 84132 ASSAY OF SERUM POTASSIUM: CPT

## 2022-05-30 PROCEDURE — P9045: CPT

## 2022-05-30 PROCEDURE — 80307 DRUG TEST PRSMV CHEM ANLYZR: CPT

## 2022-05-30 PROCEDURE — 82330 ASSAY OF CALCIUM: CPT

## 2022-05-30 PROCEDURE — 96374 THER/PROPH/DIAG INJ IV PUSH: CPT

## 2022-05-30 PROCEDURE — 82565 ASSAY OF CREATININE: CPT

## 2022-05-30 PROCEDURE — 70496 CT ANGIOGRAPHY HEAD: CPT | Mod: MA

## 2022-05-30 PROCEDURE — 83036 HEMOGLOBIN GLYCOSYLATED A1C: CPT

## 2022-05-30 PROCEDURE — 71045 X-RAY EXAM CHEST 1 VIEW: CPT

## 2022-05-30 PROCEDURE — 90715 TDAP VACCINE 7 YRS/> IM: CPT

## 2022-05-30 PROCEDURE — U0003: CPT

## 2022-05-30 PROCEDURE — 76000 FLUOROSCOPY <1 HR PHYS/QHP: CPT

## 2022-05-30 PROCEDURE — 86900 BLOOD TYPING SEROLOGIC ABO: CPT

## 2022-05-30 PROCEDURE — 86901 BLOOD TYPING SEROLOGIC RH(D): CPT

## 2022-05-30 PROCEDURE — 76377 3D RENDER W/INTRP POSTPROCES: CPT

## 2022-05-30 PROCEDURE — 97162 PT EVAL MOD COMPLEX 30 MIN: CPT

## 2022-05-30 PROCEDURE — 87635 SARS-COV-2 COVID-19 AMP PRB: CPT

## 2022-05-30 PROCEDURE — 85018 HEMOGLOBIN: CPT

## 2022-05-30 PROCEDURE — C1769: CPT

## 2022-05-30 PROCEDURE — 86850 RBC ANTIBODY SCREEN: CPT

## 2022-05-30 PROCEDURE — 72125 CT NECK SPINE W/O DYE: CPT | Mod: MA

## 2022-05-30 PROCEDURE — C9399: CPT

## 2022-05-30 PROCEDURE — 88311 DECALCIFY TISSUE: CPT

## 2022-05-30 PROCEDURE — 93005 ELECTROCARDIOGRAM TRACING: CPT

## 2022-05-30 PROCEDURE — 88304 TISSUE EXAM BY PATHOLOGIST: CPT

## 2022-05-30 PROCEDURE — 82435 ASSAY OF BLOOD CHLORIDE: CPT

## 2022-05-30 PROCEDURE — 72170 X-RAY EXAM OF PELVIS: CPT

## 2022-05-30 PROCEDURE — 70486 CT MAXILLOFACIAL W/O DYE: CPT | Mod: MA

## 2022-05-30 PROCEDURE — 97530 THERAPEUTIC ACTIVITIES: CPT

## 2022-05-30 PROCEDURE — 83735 ASSAY OF MAGNESIUM: CPT

## 2022-05-30 PROCEDURE — 85014 HEMATOCRIT: CPT

## 2022-05-30 PROCEDURE — 97535 SELF CARE MNGMENT TRAINING: CPT

## 2022-05-30 PROCEDURE — 73590 X-RAY EXAM OF LOWER LEG: CPT

## 2022-05-30 PROCEDURE — 85025 COMPLETE CBC W/AUTO DIFF WBC: CPT

## 2022-05-30 PROCEDURE — 72190 X-RAY EXAM OF PELVIS: CPT

## 2022-05-30 PROCEDURE — 70450 CT HEAD/BRAIN W/O DYE: CPT | Mod: MA

## 2022-05-30 PROCEDURE — C1889: CPT

## 2022-05-30 PROCEDURE — 82962 GLUCOSE BLOOD TEST: CPT

## 2022-05-30 PROCEDURE — 82947 ASSAY GLUCOSE BLOOD QUANT: CPT

## 2022-05-30 PROCEDURE — 84295 ASSAY OF SERUM SODIUM: CPT

## 2022-05-30 PROCEDURE — C1713: CPT

## 2022-05-30 PROCEDURE — 84100 ASSAY OF PHOSPHORUS: CPT

## 2022-05-30 PROCEDURE — 80048 BASIC METABOLIC PNL TOTAL CA: CPT

## 2022-05-30 PROCEDURE — 97167 OT EVAL HIGH COMPLEX 60 MIN: CPT

## 2022-05-30 PROCEDURE — 73502 X-RAY EXAM HIP UNI 2-3 VIEWS: CPT

## 2022-05-30 PROCEDURE — 73562 X-RAY EXAM OF KNEE 3: CPT

## 2022-05-30 PROCEDURE — 71260 CT THORAX DX C+: CPT | Mod: MA

## 2022-05-30 PROCEDURE — 81001 URINALYSIS AUTO W/SCOPE: CPT

## 2022-05-30 PROCEDURE — 82803 BLOOD GASES ANY COMBINATION: CPT

## 2022-05-30 PROCEDURE — 80053 COMPREHEN METABOLIC PANEL: CPT

## 2022-05-30 PROCEDURE — 85610 PROTHROMBIN TIME: CPT

## 2022-05-30 PROCEDURE — 97116 GAIT TRAINING THERAPY: CPT

## 2022-05-30 PROCEDURE — P9041: CPT

## 2022-05-30 PROCEDURE — 74177 CT ABD & PELVIS W/CONTRAST: CPT | Mod: MA

## 2022-05-30 PROCEDURE — 36415 COLL VENOUS BLD VENIPUNCTURE: CPT

## 2022-05-30 PROCEDURE — 97110 THERAPEUTIC EXERCISES: CPT

## 2022-05-30 PROCEDURE — U0005: CPT

## 2022-05-30 PROCEDURE — 99285 EMERGENCY DEPT VISIT HI MDM: CPT

## 2022-05-30 PROCEDURE — 86803 HEPATITIS C AB TEST: CPT

## 2022-05-30 PROCEDURE — 73552 X-RAY EXAM OF FEMUR 2/>: CPT

## 2022-05-30 PROCEDURE — 83605 ASSAY OF LACTIC ACID: CPT

## 2022-05-30 PROCEDURE — 85027 COMPLETE CBC AUTOMATED: CPT

## 2022-05-30 PROCEDURE — 85730 THROMBOPLASTIN TIME PARTIAL: CPT

## 2022-05-30 PROCEDURE — 70498 CT ANGIOGRAPHY NECK: CPT | Mod: MA

## 2022-05-30 PROCEDURE — 94002 VENT MGMT INPAT INIT DAY: CPT

## 2023-03-14 NOTE — PROGRESS NOTE ADULT - PROBLEM SELECTOR PLAN 3
Surgery is scheduled with Dr. Rafat Rocha at [facility] on 03-20.  
with PTX requiring chest tube placement  - Incentive spirometry  - Pain control  - O2 support as needed
with PTX requiring chest tube placement  - chest tube placed 3/30 with re-expansion of lung noted on CXR  - Incentive spirometry  - Pain control  - O2 support as needed  - Repeat CXR in AM.
with PTX requiring chest tube placement  - chest tube placed 3/30 with re-expansion of lung noted on CXR  - transitioned to water seal yesterday; hopefully we can remove it soon  - Incentive spirometry  - Pain control  - O2 support as needed  - Repeat CXR in AM.
with PTX requiring chest tube placement  - chest tube placed 3/30 with re-expansion of lung noted on CXR  - plan for transition to water seal today  - Incentive spirometry  - Pain control  - O2 support as needed  - Repeat CXR in AM.
